# Patient Record
Sex: FEMALE | Race: WHITE | NOT HISPANIC OR LATINO | Employment: UNEMPLOYED | URBAN - METROPOLITAN AREA
[De-identification: names, ages, dates, MRNs, and addresses within clinical notes are randomized per-mention and may not be internally consistent; named-entity substitution may affect disease eponyms.]

---

## 2017-09-18 ENCOUNTER — TELEPHONE (OUTPATIENT)
Dept: CARDIOLOGY | Facility: CLINIC | Age: 82
End: 2017-09-18

## 2017-09-18 NOTE — TELEPHONE ENCOUNTER
09/18/17  11:25 AM  Christiano Bryant  3/7/1923    Ms. Bryant's daughter calls to change her appt time with Dr. Ellis. She had a new patient appt scheduled for 10/2. They are unable to make that day, but would like to stay with Dr. Ellis. I offered them an appt on 10/18, but her daughter states they can only come in the afternoon. I offered them an appt with Dr. Ellis on 10/23 at 1240 which they accepted.     She states that Ms. Bryant in on warfarin that has been dosed by Dr. Joaquin's office. I explained that we cannot dose the warfarin until the patient is seen in our office. I placed her on the waitlist and advised they contact Dr. Joaquin's practice for further instructions.    Lashawn LEBRON RN

## 2017-10-23 ENCOUNTER — HOSPITAL ENCOUNTER (OUTPATIENT)
Dept: CARDIOLOGY | Facility: HOSPITAL | Age: 82
Setting detail: RECURRING SERIES
Discharge: HOME | End: 2017-10-23

## 2017-10-23 ENCOUNTER — OFFICE VISIT (OUTPATIENT)
Dept: CARDIOLOGY | Facility: CLINIC | Age: 82
End: 2017-10-23

## 2017-10-23 VITALS
HEART RATE: 51 BPM | WEIGHT: 139.6 LBS | DIASTOLIC BLOOD PRESSURE: 72 MMHG | BODY MASS INDEX: 23.26 KG/M2 | SYSTOLIC BLOOD PRESSURE: 142 MMHG | HEIGHT: 65 IN

## 2017-10-23 DIAGNOSIS — I48.19 PERSISTENT ATRIAL FIBRILLATION (HCC): ICD-10-CM

## 2017-10-23 DIAGNOSIS — I10 ESSENTIAL HYPERTENSION: Primary | ICD-10-CM

## 2017-10-23 PROBLEM — I48.91 ATRIAL FIBRILLATION (HCC): Status: ACTIVE | Noted: 2017-10-23

## 2017-10-23 PROCEDURE — 85610 PROTHROMBIN TIME: CPT

## 2017-10-23 PROCEDURE — 99204 OFFICE O/P NEW MOD 45 MIN: CPT | Performed by: INTERNAL MEDICINE

## 2017-10-23 PROCEDURE — 93000 ELECTROCARDIOGRAM COMPLETE: CPT | Performed by: INTERNAL MEDICINE

## 2017-10-23 PROCEDURE — 36416 COLLJ CAPILLARY BLOOD SPEC: CPT

## 2017-10-23 RX ORDER — FUROSEMIDE 20 MG/1
20 TABLET ORAL 2 TIMES DAILY
COMMUNITY
End: 2017-10-23 | Stop reason: SDUPTHER

## 2017-10-23 RX ORDER — ASPIRIN 81 MG/1
81 TABLET ORAL DAILY
COMMUNITY
End: 2017-10-23

## 2017-10-23 RX ORDER — FUROSEMIDE 20 MG/1
TABLET ORAL
Qty: 200 TABLET | Refills: 3 | Status: SHIPPED | OUTPATIENT
Start: 2017-10-23 | End: 2017-10-23 | Stop reason: SDUPTHER

## 2017-10-23 RX ORDER — CHLOROTHIAZIDE 250 MG/1
250 TABLET ORAL EVERY MORNING
Qty: 90 TABLET | Refills: 3 | Status: SHIPPED | OUTPATIENT
Start: 2017-10-23 | End: 2017-10-23 | Stop reason: SDUPTHER

## 2017-10-23 RX ORDER — FUROSEMIDE 20 MG/1
TABLET ORAL
Qty: 200 TABLET | Refills: 3 | Status: SHIPPED | OUTPATIENT
Start: 2017-10-23 | End: 2017-10-24 | Stop reason: SDUPTHER

## 2017-10-23 RX ORDER — CHLOROTHIAZIDE 250 MG/1
TABLET ORAL
Refills: 0 | COMMUNITY
Start: 2017-08-01 | End: 2017-10-23 | Stop reason: SDUPTHER

## 2017-10-23 RX ORDER — WARFARIN SODIUM 5 MG/1
TABLET ORAL
Qty: 60 TABLET | Refills: 2 | Status: SHIPPED | OUTPATIENT
Start: 2017-10-23 | End: 2017-10-23 | Stop reason: SDUPTHER

## 2017-10-23 RX ORDER — GABAPENTIN 300 MG/1
300 CAPSULE ORAL 3 TIMES DAILY
COMMUNITY
End: 2017-11-03

## 2017-10-23 RX ORDER — CARVEDILOL 6.25 MG/1
6.25 TABLET ORAL 2 TIMES DAILY WITH MEALS
Qty: 180 TABLET | Refills: 3 | Status: SHIPPED | OUTPATIENT
Start: 2017-10-23 | End: 2017-10-24 | Stop reason: SDUPTHER

## 2017-10-23 RX ORDER — WARFARIN SODIUM 5 MG/1
10 TABLET ORAL
Qty: 60 TABLET | Refills: 0 | Status: SHIPPED | OUTPATIENT
Start: 2017-10-23 | End: 2017-10-24 | Stop reason: SDUPTHER

## 2017-10-23 RX ORDER — CARVEDILOL 6.25 MG/1
TABLET ORAL
Refills: 0 | COMMUNITY
Start: 2017-08-21 | End: 2017-10-23 | Stop reason: SDUPTHER

## 2017-10-23 RX ORDER — WARFARIN SODIUM 5 MG/1
TABLET ORAL
Refills: 2 | COMMUNITY
Start: 2017-09-17 | End: 2017-10-23 | Stop reason: SDUPTHER

## 2017-10-23 RX ORDER — PILOCARPINE HYDROCHLORIDE 20 MG/ML
SOLUTION/ DROPS OPHTHALMIC DAILY
COMMUNITY
Start: 2017-09-23 | End: 2018-11-28 | Stop reason: SDDI

## 2017-10-23 RX ORDER — CARVEDILOL 6.25 MG/1
6.25 TABLET ORAL 2 TIMES DAILY WITH MEALS
Qty: 180 TABLET | Refills: 3 | Status: SHIPPED | OUTPATIENT
Start: 2017-10-23 | End: 2017-10-23 | Stop reason: SDUPTHER

## 2017-10-23 RX ORDER — CHLOROTHIAZIDE 250 MG/1
250 TABLET ORAL EVERY MORNING
Qty: 90 TABLET | Refills: 3 | Status: SHIPPED | OUTPATIENT
Start: 2017-10-23 | End: 2017-10-24 | Stop reason: SDUPTHER

## 2017-10-23 NOTE — PROGRESS NOTES
Date of Office Visit: 10/23/2017  Encounter Provider: Frida Ellis MD  Place of Service: [facility] CARDIOLOGY  Patient Name: Christiano Bryant  :3/7/1923    Chief complaint  Consult requested by Dr. rose (Dr. Mccoy for management of atrial fibrillation.    History of Present Illness  Patient is a delightful 89-year-old female with history of hypertension, hyperlipidemia, chronic renal insufficiency who had presented in 2016 with a TIA in the setting of new-onset atrial fibrillation.  She had not been on aspirin therapy at the time.  An echocardiogram revealed an ejection fraction 66% without significant valvular heart disease.  She was started on Eliquis and subsequently dismissed home without plans for aspirin therapy.    In the interim she states she was switched to Eliquis and at some point she started taking aspirin on her own.  She is still lives independently and has had  gait imbalance that she's noticed in the past year has had one or 2 falls though nothing that has led to any subsequently injured trauma.  Blood pressure checked sporadically has been as it is today.  On her own she decrease Lasix dose to just 20 mg twice a day    Past Medical History:   Diagnosis Date   • Anxiety    • Atrial fibrillation    • CKD (chronic kidney disease), stage III    • Dyslipidemia    • Elevated troponin    • Generalized weakness    • Hypertension    • Meningioma    • Near syncope    • Neuropathy    • Paresthesia    • Skin cancer    • TIA (transient ischemic attack)    • Weakness of lower extremity    • Weakness of right upper extremity      Past Surgical History:   Procedure Laterality Date   • APPENDECTOMY     • EYE SURGERY     • KNEE SURGERY Left     torn miniscus repair   • OOPHORECTOMY       No outpatient prescriptions prior to visit.     No facility-administered medications prior to visit.      Allergies as of 10/23/2017 - De as Reviewed 10/23/2017   Allergen Reaction Noted    • Penicillins  10/20/2017     Social History     Social History   • Marital status:      Spouse name: N/A   • Number of children: N/A   • Years of education: N/A     Occupational History   • Not on file.     Social History Main Topics   • Smoking status: Never Smoker   • Smokeless tobacco: Never Used   • Alcohol use 0.6 oz/week     1 Glasses of wine per week      Comment: per week   • Drug use: Not on file   • Sexual activity: Not on file     Other Topics Concern   • Not on file     Social History Narrative     Family History   Problem Relation Age of Onset   • Angina Mother    • Cancer Father    • No Known Problems Maternal Grandmother    • No Known Problems Maternal Grandfather    • No Known Problems Paternal Grandmother    • No Known Problems Paternal Grandfather      Review of Systems   Constitution: Negative for fever, malaise/fatigue, weight gain and weight loss.   HENT: Negative for ear pain, hearing loss, nosebleeds and sore throat.    Eyes: Negative for double vision, pain, vision loss in left eye and vision loss in right eye.   Cardiovascular:        See history of present illness.   Respiratory: Positive for cough and wheezing. Negative for shortness of breath, sleep disturbances due to breathing and snoring.    Endocrine: Negative for cold intolerance, heat intolerance and polyuria.   Skin: Negative for itching, poor wound healing and rash.   Musculoskeletal: Negative for joint pain, joint swelling and myalgias.   Gastrointestinal: Negative for abdominal pain, diarrhea, hematochezia, nausea and vomiting.   Genitourinary: Negative for hematuria and hesitancy.   Neurological: Positive for numbness. Negative for paresthesias and seizures.   Psychiatric/Behavioral: Negative for depression. The patient is not nervous/anxious.      Objective:     Vitals:    10/23/17 1306 10/23/17 1311   BP: 124/84 142/72   BP Location: Left arm Right arm   Pulse:  51   Weight: 139 lb 9.6 oz (63.3 kg) 139 lb 9.6 oz  "(63.3 kg)   Height: 64.5\" (163.8 cm) 64.5\" (163.8 cm)     Body mass index is 23.59 kg/(m^2).    Physical Exam   Constitutional: She is oriented to person, place, and time. She appears well-developed and well-nourished.   HENT:   Head: Normocephalic.   Nose: Nose normal.   Mouth/Throat: Oropharynx is clear and moist.   Eyes: Conjunctivae and EOM are normal. Pupils are equal, round, and reactive to light. Right eye exhibits no discharge. No scleral icterus.   Neck: Normal range of motion. Neck supple. No JVD present. No thyromegaly present.   Cardiovascular: Normal rate, regular rhythm, normal heart sounds and intact distal pulses.  Exam reveals no gallop and no friction rub.    No murmur heard.  Pulses:       Carotid pulses are 2+ on the right side, and 2+ on the left side.       Radial pulses are 2+ on the right side, and 2+ on the left side.        Femoral pulses are 2+ on the right side, and 2+ on the left side.       Popliteal pulses are 2+ on the right side, and 2+ on the left side.        Dorsalis pedis pulses are 2+ on the right side, and 2+ on the left side.        Posterior tibial pulses are 2+ on the right side, and 2+ on the left side.   Pulmonary/Chest: Effort normal and breath sounds normal. No respiratory distress. She has no wheezes. She has no rales.   Abdominal: Soft. Bowel sounds are normal. She exhibits no distension. There is no hepatosplenomegaly. There is no tenderness. There is no rebound.   Musculoskeletal: Normal range of motion. She exhibits no edema or tenderness.   Neurological: She is alert and oriented to person, place, and time.   Skin: Skin is warm and dry. No rash noted. No erythema.   Psychiatric: She has a normal mood and affect. Her behavior is normal. Judgment and thought content normal.   Vitals reviewed.    Lab Review:     ECG 12 Lead  Date/Time: 10/23/2017 9:37 PM  Performed by: KINA SCHNEIDER  Authorized by: KINA SCHNEIDER   Comparison: compared with previous ECG   Similar to " previous ECG  Rhythm: atrial fibrillation  Conduction: right bundle branch block  Conduction comments: Nonspecific ST T wave changes  QTc =  465msec  Clinical impression: abnormal ECG          Assessment:       Diagnosis Plan   1. Essential hypertension     2. Persistent atrial fibrillation       Plan:       1.  Persistent atrial fibrillation.  Rates are controlled.  I've asked her to discontinue aspirin while on Coumadin.  We'll continue the current regimen follow-up in 6 months though I did encourage her to pursue physical therapy options and a neurologic evaluation for significant gait and pounds.  He also discussed the possibility that warfarin may have to be discontinued in the future if get an pounds does not improve  2.  Hypertension blood pressure elevated today in our office on the right 152/78 on the left 148/72.  She will continue to monitor this and cough remains elevated  3.  Renal insufficiency  4.  History of TIA  5.  Dyslipidemia     Christiano Bryant   Home Medication Instructions ROYER:    Printed on:10/24/17 8295   Medication Information                      carvedilol (COREG) 6.25 MG tablet  Take 1 tablet by mouth 2 (Two) Times a Day With Meals.             chlorothiazide (DIURIL) 250 MG tablet  Take 1 tablet by mouth Every Morning.             furosemide (LASIX) 20 MG tablet  Take one tablet twice a day or as directed             gabapentin (NEURONTIN) 300 MG capsule  Take 300 mg by mouth 3 (Three) Times a Day.             pilocarpine (PILOCAR) 2 % ophthalmic solution               warfarin (COUMADIN) 5 MG tablet  Take 2 tablets by mouth Daily. Or as directed by [facility]                 New Medications Ordered This Visit   Medications   • carvedilol (COREG) 6.25 MG tablet     Sig: Take 1 tablet by mouth 2 (Two) Times a Day With Meals.     Dispense:  180 tablet     Refill:  3   • chlorothiazide (DIURIL) 250 MG tablet     Sig: Take 1 tablet by mouth Every Morning.     Dispense:  90 tablet     Refill:   3   • furosemide (LASIX) 20 MG tablet     Sig: Take one tablet twice a day or as directed     Dispense:  200 tablet     Refill:  3   • warfarin (COUMADIN) 5 MG tablet     Sig: Take 2 tablets by mouth Daily. Or as directed by [facility]     Dispense:  60 tablet     Refill:  0       Dictated utilizing Dragon dictation

## 2017-10-24 RX ORDER — FUROSEMIDE 20 MG/1
TABLET ORAL
Qty: 200 TABLET | Refills: 3 | Status: SHIPPED | OUTPATIENT
Start: 2017-10-24 | End: 2018-12-15 | Stop reason: SDUPTHER

## 2017-10-24 RX ORDER — WARFARIN SODIUM 5 MG/1
10 TABLET ORAL
Qty: 60 TABLET | Refills: 0 | Status: SHIPPED | OUTPATIENT
Start: 2017-10-24 | End: 2017-10-26 | Stop reason: SDUPTHER

## 2017-10-24 RX ORDER — CHLOROTHIAZIDE 250 MG/1
250 TABLET ORAL EVERY MORNING
Qty: 90 TABLET | Refills: 3 | Status: SHIPPED | OUTPATIENT
Start: 2017-10-24 | End: 2018-11-03 | Stop reason: SDUPTHER

## 2017-10-24 RX ORDER — CARVEDILOL 6.25 MG/1
6.25 TABLET ORAL 2 TIMES DAILY WITH MEALS
Qty: 180 TABLET | Refills: 3 | Status: SHIPPED | OUTPATIENT
Start: 2017-10-24 | End: 2017-11-17

## 2017-10-26 RX ORDER — WARFARIN SODIUM 5 MG/1
10 TABLET ORAL
Qty: 180 TABLET | Refills: 0 | Status: SHIPPED | OUTPATIENT
Start: 2017-10-26 | End: 2017-11-17

## 2017-11-03 ENCOUNTER — OFFICE VISIT (OUTPATIENT)
Dept: FAMILY MEDICINE CLINIC | Facility: CLINIC | Age: 82
End: 2017-11-03

## 2017-11-03 VITALS
DIASTOLIC BLOOD PRESSURE: 62 MMHG | SYSTOLIC BLOOD PRESSURE: 140 MMHG | WEIGHT: 140 LBS | TEMPERATURE: 98 F | BODY MASS INDEX: 23.32 KG/M2 | OXYGEN SATURATION: 98 % | HEIGHT: 65 IN | HEART RATE: 72 BPM

## 2017-11-03 DIAGNOSIS — Z23 FLU VACCINE NEED: ICD-10-CM

## 2017-11-03 DIAGNOSIS — I10 ESSENTIAL HYPERTENSION: ICD-10-CM

## 2017-11-03 DIAGNOSIS — I48.19 PERSISTENT ATRIAL FIBRILLATION (HCC): Primary | ICD-10-CM

## 2017-11-03 DIAGNOSIS — F51.01 PRIMARY INSOMNIA: ICD-10-CM

## 2017-11-03 DIAGNOSIS — Z79.899 HIGH RISK MEDICATION USE: ICD-10-CM

## 2017-11-03 LAB
INR PPP: 1.5 (ref 0.9–1.1)
PROTHROMBIN TIME: 17.4 SECONDS

## 2017-11-03 PROCEDURE — 36416 COLLJ CAPILLARY BLOOD SPEC: CPT | Performed by: FAMILY MEDICINE

## 2017-11-03 PROCEDURE — 90662 IIV NO PRSV INCREASED AG IM: CPT | Performed by: FAMILY MEDICINE

## 2017-11-03 PROCEDURE — 99204 OFFICE O/P NEW MOD 45 MIN: CPT | Performed by: FAMILY MEDICINE

## 2017-11-03 PROCEDURE — G0008 ADMIN INFLUENZA VIRUS VAC: HCPCS | Performed by: FAMILY MEDICINE

## 2017-11-03 PROCEDURE — 85610 PROTHROMBIN TIME: CPT | Performed by: FAMILY MEDICINE

## 2017-11-03 RX ORDER — TEMAZEPAM 15 MG/1
15 CAPSULE ORAL NIGHTLY PRN
COMMUNITY
End: 2017-11-03

## 2017-11-03 NOTE — PROGRESS NOTES
Subjective   Christiano Bryant is a 89 y.o. female.     Chief Complaint   Patient presents with   • Establish Care     Patient is wanting to establish primary care. Patient is needing gabapentin and temazpam refilled    • Coagulation Disorder     Patient is needing her pt/inr checked        HPI     Patient is a new patient to our office today to discuss some issues that are new to me.  Patient has a stable past medical history of atrial fibrillation on anticoagulation with Coumadin, hypertension, primary insomnia.  Patient is currently taking carvedilol 6.25 mg twice a day, gabapentin 300 mg 3 times a day, chlorothiazide 250 mg every morning, temazepam 15 mg nightly, Coumadin 5 mg daily, Lasix 20 mg twice a day.  She has been out of her gabapentin and temazepam for almost 3 weeks and has had no issues.    The following portions of the patient's history were reviewed and updated as appropriate: allergies, current medications, past family history, past medical history, past social history, past surgical history and problem list.    Review of Systems   Constitutional: Negative for fever.   All other systems reviewed and are negative.      Objective  Vitals:    11/03/17 1237   BP: 140/62   Pulse: 72   Temp: 98 °F (36.7 °C)   SpO2: 98%        Physical Exam   Constitutional: She is oriented to person, place, and time. She appears well-developed and well-nourished. No distress.   HENT:   Head: Normocephalic and atraumatic.   Right Ear: External ear normal.   Left Ear: External ear normal.   Nose: Nose normal.   Mouth/Throat: Oropharynx is clear and moist.   Eyes: Conjunctivae and EOM are normal. Pupils are equal, round, and reactive to light. Right eye exhibits no discharge. Left eye exhibits no discharge. No scleral icterus.   Neck: Normal range of motion. Neck supple.   Cardiovascular: Normal rate, regular rhythm and normal heart sounds.  Exam reveals no friction rub.    No murmur heard.  Pulmonary/Chest: Effort normal and  breath sounds normal. No respiratory distress. She has no wheezes. She has no rales.   Abdominal: Soft. Bowel sounds are normal. She exhibits no distension. There is no tenderness. There is no rebound and no guarding.   Lymphadenopathy:     She has no cervical adenopathy.   Neurological: She is alert and oriented to person, place, and time.   Skin: Skin is warm and dry. She is not diaphoretic.   Nursing note and vitals reviewed.        Current Outpatient Prescriptions:   •  carvedilol (COREG) 6.25 MG tablet, Take 1 tablet by mouth 2 (Two) Times a Day With Meals., Disp: 180 tablet, Rfl: 3  •  chlorothiazide (DIURIL) 250 MG tablet, Take 1 tablet by mouth Every Morning., Disp: 90 tablet, Rfl: 3  •  furosemide (LASIX) 20 MG tablet, Take one tablet twice a day or as directed, Disp: 200 tablet, Rfl: 3  •  pilocarpine (PILOCAR) 2 % ophthalmic solution, , Disp: , Rfl:   •  warfarin (COUMADIN) 5 MG tablet, Take 2 tablets by mouth Daily. Or as directed by [facility] (Patient taking differently: Take 5 mg by mouth Daily. Take 5mg on sun, tue, thurs,and sat and take2.5mg onmon,wed, fri), Disp: 180 tablet, Rfl: 0    Procedures    Lab Results (most recent)     Procedure Component Value Units Date/Time    POC Protime / INR [[id number]]  (Abnormal) Collected:  11/03/17 1248    Specimen:  Blood Updated:  11/03/17 1251     Protime 17.4 seconds      INR 1.5 (A)          Assessment/Plan   Christiano was seen today for establish care and coagulation disorder.    Diagnoses and all orders for this visit:    Persistent atrial fibrillation  -     POC Protime / INR    Flu vaccine need  -     Cancel: Flu Vaccine Quad PF 3YR+  -     Flu Vaccine High Dose PF 65YR+    Essential hypertension    High risk medication use    Primary insomnia    Extensive conversation had with the patient regarding the dangers of some of the medications that she is taking.  Advised the patient to discontinue the gabapentin as she does not need this medication in the  temazepam as this is a high risk medication.  Some samples of Silenor 3 mg were given to the patient to use over the next couple weeks.  Patient will follow-up in our office in 2 weeks to discuss other potential changes.    45 minutes was spent of the 45 minute visit in direct counseling and coordination of care regarding:   Encounter Diagnoses   Name Primary?   • Persistent atrial fibrillation Yes   • Flu vaccine need    • Essential hypertension    • High risk medication use    • Primary insomnia        Return in about 2 weeks (around 11/17/2017) for Recheck.      Randall Mccoy MD

## 2017-11-03 NOTE — PROGRESS NOTES
"Subjective   Christiano Bryant is a 89 y.o. female.     History of Present Illness   Christiano Bryant 89 y.o. female who presents today for a new patient appointment.    she has a history of   Patient Active Problem List   Diagnosis   • Hypertension   • Atrial fibrillation   .  she is here to establish care I reviewed the PFSH recorded today by my MA/LPN staff.   she   She has been feeling well.    The following portions of the patient's history were reviewed and updated as appropriate: allergies, current medications, past social history and problem list.    Review of Systems   Constitutional: Negative for fever.   All other systems reviewed and are negative.      Objective   /62 (BP Location: Right arm, Patient Position: Sitting)  Pulse 72  Temp 98 °F (36.7 °C)  Ht 64.5\" (163.8 cm)  Wt 140 lb (63.5 kg)  SpO2 98%  BMI 23.66 kg/m2  Physical Exam    Assessment/Plan   Problem List Items Addressed This Visit     None             "

## 2017-11-10 ENCOUNTER — OFFICE VISIT (OUTPATIENT)
Dept: FAMILY MEDICINE CLINIC | Facility: CLINIC | Age: 82
End: 2017-11-10

## 2017-11-10 VITALS
TEMPERATURE: 98.2 F | OXYGEN SATURATION: 98 % | RESPIRATION RATE: 16 BRPM | SYSTOLIC BLOOD PRESSURE: 134 MMHG | DIASTOLIC BLOOD PRESSURE: 62 MMHG | WEIGHT: 139 LBS | HEIGHT: 65 IN | BODY MASS INDEX: 23.16 KG/M2 | HEART RATE: 46 BPM

## 2017-11-10 DIAGNOSIS — I10 ESSENTIAL HYPERTENSION: ICD-10-CM

## 2017-11-10 DIAGNOSIS — R00.1 BRADYCARDIA: Primary | ICD-10-CM

## 2017-11-10 DIAGNOSIS — I48.19 PERSISTENT ATRIAL FIBRILLATION (HCC): ICD-10-CM

## 2017-11-10 DIAGNOSIS — F51.01 PRIMARY INSOMNIA: ICD-10-CM

## 2017-11-10 PROCEDURE — 99214 OFFICE O/P EST MOD 30 MIN: CPT | Performed by: FAMILY MEDICINE

## 2017-11-10 NOTE — PROGRESS NOTES
Subjective   Christiano Bryant is a 89 y.o. female.     Chief Complaint   Patient presents with   • Establish Care     Patient needs to establisha a care.    • Coagulation Disorder     Patient needs to find out when she needs to come to have her INR checked.    • Insomnia     Paient is not gettting enough sleep she always has issue with this.        HPI     Patient presents to the office today for follow-up from her visit last week where we made some changes to her medication regimen.  At that visit we discontinued some of her previous sleeping pill regimen which included benzodiazepines.  She was given samples of doxepin 3 mg.  She states that she's been trying these and has noticed a slight improvement in her sleep.  She is also uses melatonin for this.  No adverse side effects noted.  She has been complaining of some fatigue and weakness.  Blood pressure today is 134/62 with a pulse of 46.  She does have a history of A. fib and hypertension.  She's currently taking carvedilol 6.25 mg twice a day, chlorothiazide 250 mg daily and one half of a 20 mg Lasix tablet.    The following portions of the patient's history were reviewed and updated as appropriate: allergies, current medications, past family history, past medical history, past social history, past surgical history and problem list.    Review of Systems   Constitutional: Negative for activity change.   All other systems reviewed and are negative.      Objective  Vitals:    11/10/17 1243   BP: 134/62   Pulse: (!) 46   Resp: 16   Temp: 98.2 °F (36.8 °C)   SpO2: 98%        Physical Exam   Constitutional: She is oriented to person, place, and time. She appears well-developed and well-nourished. No distress.   HENT:   Head: Normocephalic and atraumatic.   Right Ear: External ear normal.   Left Ear: External ear normal.   Nose: Nose normal.   Mouth/Throat: Oropharynx is clear and moist.   Eyes: Conjunctivae and EOM are normal. Pupils are equal, round, and reactive to  light. Right eye exhibits no discharge. Left eye exhibits no discharge. No scleral icterus.   Cardiovascular: Regular rhythm and normal heart sounds.  Bradycardia present.    Pulmonary/Chest: Effort normal and breath sounds normal. No respiratory distress. She has no wheezes. She has no rales.   Abdominal: Soft. Bowel sounds are normal. She exhibits no distension. There is no tenderness.   Neurological: She is alert and oriented to person, place, and time.   Skin: Skin is warm and dry. She is not diaphoretic.   Nursing note and vitals reviewed.        Current Outpatient Prescriptions:   •  carvedilol (COREG) 6.25 MG tablet, Take 1 tablet by mouth 2 (Two) Times a Day With Meals., Disp: 180 tablet, Rfl: 3  •  chlorothiazide (DIURIL) 250 MG tablet, Take 1 tablet by mouth Every Morning., Disp: 90 tablet, Rfl: 3  •  pilocarpine (PILOCAR) 2 % ophthalmic solution, , Disp: , Rfl:   •  warfarin (COUMADIN) 5 MG tablet, Take 2 tablets by mouth Daily. Or as directed by [facility] (Patient taking differently: Take 5 mg by mouth Daily. Take 5mg on sun, tue, thurs,and sat and take2.5mg onmon,wed, fri), Disp: 180 tablet, Rfl: 0  •  furosemide (LASIX) 20 MG tablet, Take one tablet twice a day or as directed (Patient taking differently: 10 mg Daily. Take one tablet twice a day or as directed), Disp: 200 tablet, Rfl: 3    Procedures    Lab Results (most recent)     None          Assessment/Plan   Christiano was seen today for establish care, coagulation disorder and insomnia.    Diagnoses and all orders for this visit:    Bradycardia  -     CBC Auto Differential  -     Comprehensive Metabolic Panel  -     Magnesium  -     Phosphorus    Persistent atrial fibrillation  -     CBC Auto Differential  -     Comprehensive Metabolic Panel  -     Magnesium  -     Phosphorus    Essential hypertension  -     CBC Auto Differential  -     Comprehensive Metabolic Panel  -     Magnesium  -     Phosphorus    We'll get labs as above to evaluate for the  patient's symptomatic bradycardia.  Advised that she take one half of her carvedilol tablet twice a day.  Advised her to discontinue the Lasix.  Discussed reasons for an ER visit.  Follow-up next week.  Samples were given for the patient to take doxepin 6 mg nightly.    Return in about 1 week (around 11/17/2017) for Recheck.      Randall Mccoy MD

## 2017-11-11 LAB
ALBUMIN SERPL-MCNC: 4.1 G/DL (ref 3.2–4.6)
ALBUMIN/GLOB SERPL: 1.9 {RATIO} (ref 1.2–2.2)
ALP SERPL-CCNC: 92 IU/L (ref 39–117)
ALT SERPL-CCNC: 15 IU/L (ref 0–32)
AST SERPL-CCNC: 17 IU/L (ref 0–40)
BASOPHILS # BLD AUTO: 0 X10E3/UL (ref 0–0.2)
BASOPHILS NFR BLD AUTO: 0 %
BILIRUB SERPL-MCNC: 1.1 MG/DL (ref 0–1.2)
BUN SERPL-MCNC: 31 MG/DL (ref 10–36)
BUN/CREAT SERPL: 25 (ref 12–28)
CALCIUM SERPL-MCNC: 10 MG/DL (ref 8.7–10.3)
CHLORIDE SERPL-SCNC: 100 MMOL/L (ref 96–106)
CO2 SERPL-SCNC: 27 MMOL/L (ref 18–29)
CREAT SERPL-MCNC: 1.25 MG/DL (ref 0.57–1)
EOSINOPHIL # BLD AUTO: 0.1 X10E3/UL (ref 0–0.4)
EOSINOPHIL NFR BLD AUTO: 2 %
ERYTHROCYTE [DISTWIDTH] IN BLOOD BY AUTOMATED COUNT: 14.8 % (ref 12.3–15.4)
GFR SERPLBLD CREATININE-BSD FMLA CKD-EPI: 37 ML/MIN/1.73
GFR SERPLBLD CREATININE-BSD FMLA CKD-EPI: 43 ML/MIN/1.73
GLOBULIN SER CALC-MCNC: 2.2 G/DL (ref 1.5–4.5)
GLUCOSE SERPL-MCNC: 83 MG/DL (ref 65–99)
HCT VFR BLD AUTO: 37.4 % (ref 34–46.6)
HGB BLD-MCNC: 12.3 G/DL (ref 11.1–15.9)
IMM GRANULOCYTES # BLD: 0 X10E3/UL (ref 0–0.1)
IMM GRANULOCYTES NFR BLD: 0 %
LYMPHOCYTES # BLD AUTO: 1.3 X10E3/UL (ref 0.7–3.1)
LYMPHOCYTES NFR BLD AUTO: 20 %
MAGNESIUM SERPL-MCNC: 2.1 MG/DL (ref 1.6–2.3)
MCH RBC QN AUTO: 27.9 PG (ref 26.6–33)
MCHC RBC AUTO-ENTMCNC: 32.9 G/DL (ref 31.5–35.7)
MCV RBC AUTO: 85 FL (ref 79–97)
MONOCYTES # BLD AUTO: 0.5 X10E3/UL (ref 0.1–0.9)
MONOCYTES NFR BLD AUTO: 8 %
MORPHOLOGY BLD-IMP: (no result)
NEUTROPHILS # BLD AUTO: 4.4 X10E3/UL (ref 1.4–7)
NEUTROPHILS NFR BLD AUTO: 70 %
PHOSPHATE SERPL-MCNC: 3.3 MG/DL (ref 2.5–4.5)
PLATELET # BLD AUTO: 113 X10E3/UL (ref 150–379)
POTASSIUM SERPL-SCNC: 4.1 MMOL/L (ref 3.5–5.2)
PROT SERPL-MCNC: 6.3 G/DL (ref 6–8.5)
RBC # BLD AUTO: 4.41 X10E6/UL (ref 3.77–5.28)
SODIUM SERPL-SCNC: 141 MMOL/L (ref 134–144)
WBC # BLD AUTO: 6.3 X10E3/UL (ref 3.4–10.8)

## 2017-11-13 ENCOUNTER — TELEPHONE (OUTPATIENT)
Dept: FAMILY MEDICINE CLINIC | Facility: CLINIC | Age: 82
End: 2017-11-13

## 2017-11-13 NOTE — TELEPHONE ENCOUNTER
I would like to avoid having to restart that medication due to adverse side effect profile.  I would prefer if the patient will try Tylenol 650 mg 3 times a day to see if this helps.  If not we can discuss going back on gabapentin.  Thank you.

## 2017-11-17 ENCOUNTER — OFFICE VISIT (OUTPATIENT)
Dept: FAMILY MEDICINE CLINIC | Facility: CLINIC | Age: 82
End: 2017-11-17

## 2017-11-17 VITALS
BODY MASS INDEX: 22.66 KG/M2 | WEIGHT: 136 LBS | OXYGEN SATURATION: 95 % | HEART RATE: 65 BPM | TEMPERATURE: 98.1 F | SYSTOLIC BLOOD PRESSURE: 110 MMHG | HEIGHT: 65 IN | DIASTOLIC BLOOD PRESSURE: 60 MMHG

## 2017-11-17 DIAGNOSIS — Z79.01 CHRONIC ANTICOAGULATION: ICD-10-CM

## 2017-11-17 DIAGNOSIS — I10 ESSENTIAL HYPERTENSION: Primary | ICD-10-CM

## 2017-11-17 DIAGNOSIS — Z79.899 HIGH RISK MEDICATION USE: ICD-10-CM

## 2017-11-17 DIAGNOSIS — F51.01 PRIMARY INSOMNIA: ICD-10-CM

## 2017-11-17 DIAGNOSIS — I48.19 PERSISTENT ATRIAL FIBRILLATION (HCC): ICD-10-CM

## 2017-11-17 PROCEDURE — 99214 OFFICE O/P EST MOD 30 MIN: CPT | Performed by: FAMILY MEDICINE

## 2017-11-17 RX ORDER — CARVEDILOL 6.25 MG/1
3.12 TABLET ORAL 2 TIMES DAILY WITH MEALS
Qty: 180 TABLET | Refills: 3 | Status: SHIPPED | OUTPATIENT
Start: 2017-11-17 | End: 2019-03-26

## 2017-11-17 RX ORDER — WARFARIN SODIUM 5 MG/1
TABLET ORAL
Qty: 90 TABLET | Refills: 2 | Status: SHIPPED | OUTPATIENT
Start: 2017-11-17 | End: 2018-03-25 | Stop reason: SDUPTHER

## 2017-11-17 NOTE — PROGRESS NOTES
Subjective   Christiano Bryant is a 89 y.o. female.     Chief Complaint   Patient presents with   • Fatigue      follow up on warfarin and pt states to feel very tired lately       HPI     Patient resents the office today to follow-up on a number of issues.  History of hypertension and A. fib for which she has been taking carvedilol 6.25 mg twice a day.  She does report some weakness and has been documented to have right cardio in the past.  Blood pressure today is 110/60 with a heart rate of 65.  She also suffers from insomnia which she was producing taking temazepam.  We have weaned her off of this medication and she is successfully having good results with doxepin 6 mg nightly.  She is in need of her INR checked today.  Taking Coumadin 5 mg on Saturday, Sunday, and Wednesday with one half tablet on the rest of the days.    The following portions of the patient's history were reviewed and updated as appropriate: allergies, current medications, past family history, past medical history, past social history, past surgical history and problem list.    Review of Systems   Constitutional: Positive for fatigue.   HENT: Negative.    Eyes: Negative.    Respiratory: Negative.    Cardiovascular: Negative.    Endocrine: Negative.    Genitourinary: Negative.    Musculoskeletal: Positive for myalgias.   Skin: Negative.    Allergic/Immunologic: Negative.    Neurological: Positive for weakness.   Hematological: Negative.    Psychiatric/Behavioral: Positive for sleep disturbance.   All other systems reviewed and are negative.      Objective  Vitals:    11/17/17 1534   BP: 110/60   Pulse: 65   Temp: 98.1 °F (36.7 °C)   SpO2: 95%        Physical Exam   Constitutional: She is oriented to person, place, and time. She appears well-developed and well-nourished. No distress.   HENT:   Head: Normocephalic and atraumatic.   Right Ear: External ear normal.   Left Ear: External ear normal.   Nose: Nose normal.   Mouth/Throat: Oropharynx is clear  and moist.   Eyes: Conjunctivae and EOM are normal. Pupils are equal, round, and reactive to light. Right eye exhibits no discharge. Left eye exhibits no discharge. No scleral icterus.   Cardiovascular: Normal rate, regular rhythm and normal heart sounds.    Pulmonary/Chest: Effort normal and breath sounds normal. No respiratory distress. She has no wheezes. She has no rales.   Abdominal: Soft. Bowel sounds are normal. She exhibits no distension. There is no tenderness.   Neurological: She is alert and oriented to person, place, and time.   Skin: Skin is warm and dry. She is not diaphoretic.   Nursing note and vitals reviewed.        Current Outpatient Prescriptions:   •  carvedilol (COREG) 6.25 MG tablet, Take 0.5 tablets by mouth 2 (Two) Times a Day With Meals., Disp: 180 tablet, Rfl: 3  •  chlorothiazide (DIURIL) 250 MG tablet, Take 1 tablet by mouth Every Morning., Disp: 90 tablet, Rfl: 3  •  furosemide (LASIX) 20 MG tablet, Take one tablet twice a day or as directed (Patient taking differently: 10 mg Daily. Take one tablet twice a day or as directed), Disp: 200 tablet, Rfl: 3  •  pilocarpine (PILOCAR) 2 % ophthalmic solution, , Disp: , Rfl:   •  warfarin (COUMADIN) 5 MG tablet, 1 tab on Saturday, Sunday, Monday, and Tuesday. 1/2 tab on Wednesday, Thursday, and Friday., Disp: 90 tablet, Rfl: 2    Procedures    Lab Results (most recent)     None          Assessment/Plan   Christiano was seen today for fatigue.    Diagnoses and all orders for this visit:    Essential hypertension  -     carvedilol (COREG) 6.25 MG tablet; Take 0.5 tablets by mouth 2 (Two) Times a Day With Meals.    Persistent atrial fibrillation  -     carvedilol (COREG) 6.25 MG tablet; Take 0.5 tablets by mouth 2 (Two) Times a Day With Meals.  -     warfarin (COUMADIN) 5 MG tablet; 1 tab on Saturday, Sunday, Monday, and Tuesday. 1/2 tab on Wednesday, Thursday, and Friday.    Primary insomnia    High risk medication use  -     warfarin (COUMADIN) 5 MG  tablet; 1 tab on Saturday, Sunday, Monday, and Tuesday. 1/2 tab on Wednesday, Thursday, and Friday.    Chronic anticoagulation  -     warfarin (COUMADIN) 5 MG tablet; 1 tab on Saturday, Sunday, Monday, and Tuesday. 1/2 tab on Wednesday, Thursday, and Friday.    INR is 1.6.  We will increase the dose and adjust her regimen to 5 mg on Saturday, Sunday, Monday, and Tuesday with half a tablet on Wednesday, Thursday, and Friday.  This may improve her adherence to the medication due to having a little bit easier time with pill placement in her pillbox.  We will continue doxepin 6 mg for sleep.  We will decrease her carvedilol to one half tablet twice a day.  We'll follow-up in 2 weeks.      Return in about 2 weeks (around 12/1/2017) for Recheck.      Randall Mccoy MD

## 2017-11-29 ENCOUNTER — OFFICE VISIT (OUTPATIENT)
Dept: FAMILY MEDICINE CLINIC | Facility: CLINIC | Age: 82
End: 2017-11-29

## 2017-11-29 VITALS
TEMPERATURE: 98.6 F | RESPIRATION RATE: 12 BRPM | OXYGEN SATURATION: 99 % | SYSTOLIC BLOOD PRESSURE: 138 MMHG | WEIGHT: 136 LBS | HEIGHT: 64 IN | HEART RATE: 62 BPM | DIASTOLIC BLOOD PRESSURE: 52 MMHG | BODY MASS INDEX: 23.22 KG/M2

## 2017-11-29 DIAGNOSIS — I48.19 PERSISTENT ATRIAL FIBRILLATION (HCC): Primary | ICD-10-CM

## 2017-11-29 DIAGNOSIS — J32.9 SINOBRONCHITIS: ICD-10-CM

## 2017-11-29 DIAGNOSIS — J40 SINOBRONCHITIS: ICD-10-CM

## 2017-11-29 LAB
EXPIRATION DATE: NORMAL
FLUAV AG NPH QL: NORMAL
FLUBV AG NPH QL: NORMAL
INR PPP: 1.6 (ref 0.9–1.1)
INTERNAL CONTROL: NORMAL
Lab: NORMAL
PROTHROMBIN TIME: 29 SECONDS

## 2017-11-29 PROCEDURE — 87804 INFLUENZA ASSAY W/OPTIC: CPT | Performed by: FAMILY MEDICINE

## 2017-11-29 PROCEDURE — 85610 PROTHROMBIN TIME: CPT | Performed by: FAMILY MEDICINE

## 2017-11-29 PROCEDURE — 99214 OFFICE O/P EST MOD 30 MIN: CPT | Performed by: FAMILY MEDICINE

## 2017-11-29 PROCEDURE — 36416 COLLJ CAPILLARY BLOOD SPEC: CPT | Performed by: FAMILY MEDICINE

## 2017-11-29 RX ORDER — AZITHROMYCIN 250 MG/1
TABLET, FILM COATED ORAL
Qty: 6 TABLET | Refills: 0 | Status: SHIPPED | OUTPATIENT
Start: 2017-11-29 | End: 2018-01-19

## 2017-11-29 RX ORDER — BENZONATATE 200 MG/1
200 CAPSULE ORAL 3 TIMES DAILY PRN
Qty: 30 CAPSULE | Refills: 0 | Status: SHIPPED | OUTPATIENT
Start: 2017-11-29 | End: 2018-01-19

## 2017-11-29 NOTE — PROGRESS NOTES
Subjective   Christiano Bryant is a 89 y.o. female.     Chief Complaint   Patient presents with   • Cough     Patient is having cough and congestion. She needs to check on her INR.    • Back Pain     Patient has chest and back tightness and pain due to her cough.       HPI     Patient presents today complaining of some continued congestion and sinus drainage.  She's been coughing quite a bit which is causing some pain in her back.  She is also felt weak and reports some episodes of feeling lightheaded when getting up from sitting.  She does have a history of A. fib.  Currently taking and tolerating her carvedilol as well as her warfarin.  She also uses Lasix on a when necessary basis.  This is been something that I have advised her to hold recently due to concerns about long-term use of this medication.    The following portions of the patient's history were reviewed and updated as appropriate: allergies, current medications, past family history, past medical history, past social history, past surgical history and problem list.    Review of Systems   HENT: Positive for congestion.    Respiratory: Positive for cough.    Cardiovascular: Positive for chest pain.   Musculoskeletal: Positive for back pain.   All other systems reviewed and are negative.      Objective  Vitals:    11/29/17 1145   BP: 138/52   Pulse: 62   Resp: 12   Temp: 98.6 °F (37 °C)   SpO2: 99%        Physical Exam   Constitutional: She is oriented to person, place, and time. She appears well-developed and well-nourished. No distress.   HENT:   Head: Normocephalic and atraumatic.   Right Ear: Tympanic membrane, external ear and ear canal normal.   Left Ear: Tympanic membrane, external ear and ear canal normal.   Nose: Mucosal edema and rhinorrhea present. Right sinus exhibits no maxillary sinus tenderness and no frontal sinus tenderness. Left sinus exhibits no maxillary sinus tenderness and no frontal sinus tenderness.   Mouth/Throat: Uvula is midline.  Posterior oropharyngeal erythema present. No oropharyngeal exudate, posterior oropharyngeal edema or tonsillar abscesses. No tonsillar exudate.   Eyes: Conjunctivae, EOM and lids are normal. Pupils are equal, round, and reactive to light.   Cardiovascular: Normal rate, regular rhythm and normal heart sounds.  Exam reveals no friction rub.    No murmur heard.  Pulmonary/Chest: Effort normal and breath sounds normal. No respiratory distress. She has no wheezes. She has no rales.   Abdominal: Soft. Bowel sounds are normal. She exhibits no distension. There is no tenderness. There is no rebound and no guarding.   Neurological: She is alert and oriented to person, place, and time.   Skin: Skin is warm and dry. She is not diaphoretic.   Nursing note and vitals reviewed.        Current Outpatient Prescriptions:   •  carvedilol (COREG) 6.25 MG tablet, Take 0.5 tablets by mouth 2 (Two) Times a Day With Meals., Disp: 180 tablet, Rfl: 3  •  chlorothiazide (DIURIL) 250 MG tablet, Take 1 tablet by mouth Every Morning., Disp: 90 tablet, Rfl: 3  •  furosemide (LASIX) 20 MG tablet, Take one tablet twice a day or as directed (Patient taking differently: 10 mg Daily. Take one tablet twice a day or as directed), Disp: 200 tablet, Rfl: 3  •  pilocarpine (PILOCAR) 2 % ophthalmic solution, , Disp: , Rfl:   •  warfarin (COUMADIN) 5 MG tablet, 1 tab on Saturday, Sunday, Monday, and Tuesday. 1/2 tab on Wednesday, Thursday, and Friday., Disp: 90 tablet, Rfl: 2  •  azithromycin (ZITHROMAX Z-DANIEL) 250 MG tablet, Take 2 tablets the first day, then 1 tablet daily for 4 days., Disp: 6 tablet, Rfl: 0  •  benzonatate (TESSALON) 200 MG capsule, Take 1 capsule by mouth 3 (Three) Times a Day As Needed for Cough., Disp: 30 capsule, Rfl: 0    Procedures    Lab Results (most recent)     None          Assessment/Plan   Christiano was seen today for cough and back pain.    Diagnoses and all orders for this visit:    Persistent atrial fibrillation  -     POC  Protime / INR    Sinobronchitis  -     azithromycin (ZITHROMAX Z-DANIEL) 250 MG tablet; Take 2 tablets the first day, then 1 tablet daily for 4 days.  -     benzonatate (TESSALON) 200 MG capsule; Take 1 capsule by mouth 3 (Three) Times a Day As Needed for Cough.  -     POC Influenza A / B    Flu test negative.  We'll treat with azithromycin as above.  Also some Tessalon Perles.  We'll monitor closely.  Advised on reasons for an ER visit.    Return in about 1 week (around 12/6/2017) for Recheck.      Randall Mccoy MD

## 2017-12-05 RX ORDER — WARFARIN SODIUM 5 MG/1
TABLET ORAL
Qty: 60 TABLET | Refills: 0 | OUTPATIENT
Start: 2017-12-05

## 2018-01-09 ENCOUNTER — TELEPHONE (OUTPATIENT)
Dept: FAMILY MEDICINE CLINIC | Facility: CLINIC | Age: 83
End: 2018-01-09

## 2018-01-09 NOTE — TELEPHONE ENCOUNTER
Pt;s daughter called and said her mother like Alexis anabel and she needs a new Rx for it 6 mg once at night. She did ask if possible giving her a little amount of Temazepam that she used to take before and keep it for as needed she some nights does not sleep.

## 2018-01-10 RX ORDER — DOXEPIN HYDROCHLORIDE 6 MG/1
1 TABLET ORAL NIGHTLY
Qty: 30 TABLET | Refills: 5 | Status: SHIPPED | OUTPATIENT
Start: 2018-01-10 | End: 2018-04-23

## 2018-01-12 NOTE — TELEPHONE ENCOUNTER
Please call in a prescription to the patient's pharmacy for Belsomra 10 mg 1 tablet daily at bedtime dispense 30 tablets with 0 refills.  Please let patient know that a prescription has been consulted.  Thank you.

## 2018-01-19 ENCOUNTER — OFFICE VISIT (OUTPATIENT)
Dept: FAMILY MEDICINE CLINIC | Facility: CLINIC | Age: 83
End: 2018-01-19

## 2018-01-19 VITALS
SYSTOLIC BLOOD PRESSURE: 124 MMHG | WEIGHT: 142 LBS | TEMPERATURE: 98.3 F | HEART RATE: 47 BPM | OXYGEN SATURATION: 99 % | DIASTOLIC BLOOD PRESSURE: 68 MMHG | BODY MASS INDEX: 24.24 KG/M2 | HEIGHT: 64 IN | RESPIRATION RATE: 14 BRPM

## 2018-01-19 DIAGNOSIS — I48.19 PERSISTENT ATRIAL FIBRILLATION (HCC): ICD-10-CM

## 2018-01-19 DIAGNOSIS — M17.12 ARTHRITIS OF LEFT KNEE: ICD-10-CM

## 2018-01-19 DIAGNOSIS — M54.9 BACK PAIN, UNSPECIFIED BACK LOCATION, UNSPECIFIED BACK PAIN LATERALITY, UNSPECIFIED CHRONICITY: Primary | ICD-10-CM

## 2018-01-19 LAB
BILIRUB BLD-MCNC: NEGATIVE MG/DL
CLARITY, POC: CLEAR
COLOR UR: YELLOW
GLUCOSE UR STRIP-MCNC: NEGATIVE MG/DL
INR PPP: 2.2 (ref 0.9–1.1)
KETONES UR QL: NEGATIVE
LEUKOCYTE EST, POC: NEGATIVE
NITRITE UR-MCNC: NEGATIVE MG/ML
PH UR: 7 [PH] (ref 5–8)
PROT UR STRIP-MCNC: ABNORMAL MG/DL
PROTHROMBIN TIME: 22.3 SECONDS
RBC # UR STRIP: NEGATIVE /UL
SP GR UR: 1.02 (ref 1–1.03)
UROBILINOGEN UR QL: NORMAL

## 2018-01-19 PROCEDURE — 36416 COLLJ CAPILLARY BLOOD SPEC: CPT | Performed by: NURSE PRACTITIONER

## 2018-01-19 PROCEDURE — 85610 PROTHROMBIN TIME: CPT | Performed by: NURSE PRACTITIONER

## 2018-01-19 PROCEDURE — 81003 URINALYSIS AUTO W/O SCOPE: CPT | Performed by: NURSE PRACTITIONER

## 2018-01-19 PROCEDURE — 20610 DRAIN/INJ JOINT/BURSA W/O US: CPT | Performed by: NURSE PRACTITIONER

## 2018-01-19 PROCEDURE — 99213 OFFICE O/P EST LOW 20 MIN: CPT | Performed by: NURSE PRACTITIONER

## 2018-01-19 RX ORDER — TRIAMCINOLONE ACETONIDE 40 MG/ML
40 INJECTION, SUSPENSION INTRA-ARTICULAR; INTRAMUSCULAR ONCE
Status: DISCONTINUED | OUTPATIENT
Start: 2018-01-19 | End: 2018-09-21

## 2018-01-19 NOTE — PROGRESS NOTES
"Subjective   Christiano Bryant is a 89 y.o. female.     History of Present Illness   Patient presenting to the office today with her daughter who has concerns or questions or urinary tract infection.  A week ago she was feeling very tired and was having some low back pain but she's been feeling better for the past 3 days.    She has osteoarthritis in her left knee has been consistently giving her a lot of pain she does see an orthopedic she hasn't seen him in over a year she's also not had any knee injections in over a year.  She is requesting an injection today.    The following portions of the patient's history were reviewed and updated as appropriate: allergies, current medications, past social history and problem list.    Review of Systems   Musculoskeletal: Positive for back pain and joint swelling.   All other systems reviewed and are negative.      Objective   /68 (BP Location: Right arm, Patient Position: Sitting, Cuff Size: Adult)  Pulse (!) 47  Temp 98.3 °F (36.8 °C) (Oral)   Resp 14  Ht 162.6 cm (64\")  Wt 64.4 kg (142 lb)  SpO2 99%  BMI 24.37 kg/m2    Physical Exam   Constitutional: She is oriented to person, place, and time. Vital signs are normal. She appears well-developed and well-nourished. No distress.   HENT:   Head: Normocephalic.   Cardiovascular: Normal rate, regular rhythm and normal heart sounds.    Pulmonary/Chest: Effort normal and breath sounds normal.   Neurological: She is alert and oriented to person, place, and time. Gait normal.   Psychiatric: She has a normal mood and affect. Her behavior is normal. Judgment and thought content normal.   Vitals reviewed.    Arthrocentesis  Date/Time: 1/19/2018 11:01 AM  Performed by: SUKHDEV ESCOBAR  Authorized by: SUKHDEV ESCOBAR   Consent: Verbal consent obtained.  Consent given by: patient  Patient understanding: patient states understanding of the procedure being performed  Patient consent: the patient's understanding of the procedure matches " consent given  Procedure consent: procedure consent matches procedure scheduled  Relevant documents: relevant documents present and verified  Test results: test results available and properly labeled  Site marked: the operative site was marked  Imaging studies: imaging studies available  Patient identity confirmed: verbally with patient  Indications: pain   Body area: knee  Joint: left knee  Local anesthesia used: yes    Anesthesia:  Local anesthesia used: yes  Local Anesthetic: topical anesthetic    Sedation:  Patient sedated: no  Needle size: 20 G  Ultrasound guidance: no  Approach: lateral  Triamcinolone amount: 40 mg  Patient tolerance: Patient tolerated the procedure well with no immediate complications        Assessment/Plan   Problem List Items Addressed This Visit        Cardiovascular and Mediastinum    Atrial fibrillation    Relevant Orders    POC Protime / INR (Completed)       Nervous and Auditory    Back pain - Primary    Relevant Orders    POC Urinalysis Dipstick, Automated (Completed)       Musculoskeletal and Integument    Arthritis of left knee    Relevant Medications    triamcinolone acetonide (KENALOG-40) injection 40 mg (Start on 1/19/2018 11:45 AM)    Other Relevant Orders    Arthrocentesis           cont same with coumadin and recheck in 4 weeks     Ice to knee today and tomorrow rto prn    stretches for back pain and Advil

## 2018-02-09 ENCOUNTER — OFFICE VISIT (OUTPATIENT)
Dept: FAMILY MEDICINE CLINIC | Facility: CLINIC | Age: 83
End: 2018-02-09

## 2018-02-09 VITALS
DIASTOLIC BLOOD PRESSURE: 66 MMHG | SYSTOLIC BLOOD PRESSURE: 124 MMHG | TEMPERATURE: 98.6 F | HEART RATE: 61 BPM | HEIGHT: 64 IN | BODY MASS INDEX: 26.46 KG/M2 | WEIGHT: 155 LBS | OXYGEN SATURATION: 99 % | RESPIRATION RATE: 16 BRPM

## 2018-02-09 DIAGNOSIS — R26.89 BALANCE PROBLEM: ICD-10-CM

## 2018-02-09 DIAGNOSIS — R53.81 DEBILITY: ICD-10-CM

## 2018-02-09 DIAGNOSIS — F41.0 PANIC ATTACKS: ICD-10-CM

## 2018-02-09 DIAGNOSIS — F51.01 PRIMARY INSOMNIA: Primary | ICD-10-CM

## 2018-02-09 DIAGNOSIS — F51.01 PRIMARY INSOMNIA: ICD-10-CM

## 2018-02-09 PROCEDURE — 99214 OFFICE O/P EST MOD 30 MIN: CPT | Performed by: FAMILY MEDICINE

## 2018-02-09 RX ORDER — MIRTAZAPINE 7.5 MG/1
7.5 TABLET, FILM COATED ORAL NIGHTLY
Qty: 30 TABLET | Refills: 0 | Status: SHIPPED | OUTPATIENT
Start: 2018-02-09 | End: 2018-02-09 | Stop reason: SDUPTHER

## 2018-02-09 RX ORDER — MIRTAZAPINE 7.5 MG/1
TABLET, FILM COATED ORAL
Qty: 90 TABLET | Refills: 0 | Status: SHIPPED | OUTPATIENT
Start: 2018-02-09 | End: 2018-09-10

## 2018-02-09 NOTE — PROGRESS NOTES
Subjective   Christiano Bryant is a 89 y.o. female.     Chief Complaint   Patient presents with   • Anxiety     Patient had a history of anxiety and used to get a xanax before. She is having it back now she thinks it is more likely in winter time.        HPI     Patient presents to discuss a number of issues.  Patient states that she's been having panic attacks.  She states that she will have these episodes where she will get really worked up emotionally and have some shortness of breath.  She states that she was concerned recently about a storm that was coming and had an issue.  She will also wake up from sleeping at night feeling short of breath and panicky.  She's also been having issues with insomnia.  She's tried a number of prescriptions including doxepin and Belsomra.  Insurance would not cover the doxepin and she did not like the side effects of Belsomra.  She is currently not taking anything and having issues sleeping at night.  Patient states that she's also noticed over the last 6 months to year issues with balance.  The patient's daughter states that she largely just sits around watching TV all day and has become deconditioned.  Patient denies any numbness or tingling.  No vision issues.    The following portions of the patient's history were reviewed and updated as appropriate: allergies, current medications, past family history, past medical history, past social history, past surgical history and problem list.    Review of Systems   Psychiatric/Behavioral: The patient is nervous/anxious.    All other systems reviewed and are negative.      Objective  Vitals:    02/09/18 0938   BP: 124/66   Pulse: 61   Resp: 16   Temp: 98.6 °F (37 °C)   SpO2: 99%        Physical Exam   Constitutional: She is oriented to person, place, and time. She appears well-developed and well-nourished. No distress.   HENT:   Head: Normocephalic and atraumatic.   Right Ear: External ear normal.   Left Ear: External ear normal.   Nose:  Nose normal.   Mouth/Throat: Oropharynx is clear and moist.   Eyes: Conjunctivae and EOM are normal. Pupils are equal, round, and reactive to light. Right eye exhibits no discharge. Left eye exhibits no discharge. No scleral icterus.   Cardiovascular: Normal rate, regular rhythm and normal heart sounds.    Pulmonary/Chest: Effort normal and breath sounds normal. No respiratory distress. She has no wheezes. She has no rales.   Abdominal: Soft. Bowel sounds are normal. She exhibits no distension. There is no tenderness.   Neurological: She is alert and oriented to person, place, and time.   Skin: Skin is warm and dry. She is not diaphoretic.   Nursing note and vitals reviewed.        Current Outpatient Prescriptions:   •  carvedilol (COREG) 6.25 MG tablet, Take 0.5 tablets by mouth 2 (Two) Times a Day With Meals., Disp: 180 tablet, Rfl: 3  •  chlorothiazide (DIURIL) 250 MG tablet, Take 1 tablet by mouth Every Morning., Disp: 90 tablet, Rfl: 3  •  Doxepin HCl 6 MG tablet, Take 1 tablet by mouth Every Night., Disp: 30 tablet, Rfl: 5  •  pilocarpine (PILOCAR) 2 % ophthalmic solution, , Disp: , Rfl:   •  Suvorexant 10 MG tablet, Take 1 tablet by mouth Every Night., Disp: 30 tablet, Rfl: 0  •  warfarin (COUMADIN) 5 MG tablet, 1 tab on Saturday, Sunday, Monday, and Tuesday. 1/2 tab on Wednesday, Thursday, and Friday., Disp: 90 tablet, Rfl: 2  •  furosemide (LASIX) 20 MG tablet, Take one tablet twice a day or as directed (Patient taking differently: 10 mg Daily. Take one tablet twice a day or as directed), Disp: 200 tablet, Rfl: 3  •  mirtazapine (REMERON) 7.5 MG tablet, Take 1 tablet by mouth Every Night., Disp: 30 tablet, Rfl: 0    Current Facility-Administered Medications:   •  triamcinolone acetonide (KENALOG-40) injection 40 mg, 40 mg, Intra-articular, Once, [facility]    Procedures    Lab Results (most recent)     None          Assessment/Plan   Christiano was seen today for anxiety.    Diagnoses and all orders for  this visit:    Primary insomnia  -     mirtazapine (REMERON) 7.5 MG tablet; Take 1 tablet by mouth Every Night.    Panic attacks  -     mirtazapine (REMERON) 7.5 MG tablet; Take 1 tablet by mouth Every Night.    Debility  -     Ambulatory Referral to Physical Therapy Evaluate and treat    Balance problem  -     Ambulatory Referral to Physical Therapy Evaluate and treat    Patient is quite frail.  We will have the patient do physical therapy for deconditioning as well as gait training.  We discussed treatment options for both insomnia and anxiety.  Will do a one-month trial of mirtazapine.  Discussed potential adverse side effects.    Return in about 4 weeks (around 3/9/2018) for Recheck.      Randall Mccoy MD

## 2018-03-09 ENCOUNTER — OFFICE VISIT (OUTPATIENT)
Dept: FAMILY MEDICINE CLINIC | Facility: CLINIC | Age: 83
End: 2018-03-09

## 2018-03-09 VITALS
HEIGHT: 64 IN | BODY MASS INDEX: 26.46 KG/M2 | DIASTOLIC BLOOD PRESSURE: 80 MMHG | OXYGEN SATURATION: 98 % | WEIGHT: 155 LBS | SYSTOLIC BLOOD PRESSURE: 122 MMHG | TEMPERATURE: 98.6 F | RESPIRATION RATE: 14 BRPM | HEART RATE: 71 BPM

## 2018-03-09 DIAGNOSIS — F41.9 ANXIETY: ICD-10-CM

## 2018-03-09 DIAGNOSIS — F51.01 PRIMARY INSOMNIA: ICD-10-CM

## 2018-03-09 DIAGNOSIS — I48.19 PERSISTENT ATRIAL FIBRILLATION (HCC): Primary | ICD-10-CM

## 2018-03-09 LAB
INR PPP: 1.8 (ref 0.9–1.1)
PROTHROMBIN TIME: 19 SECONDS

## 2018-03-09 PROCEDURE — 99214 OFFICE O/P EST MOD 30 MIN: CPT | Performed by: FAMILY MEDICINE

## 2018-03-09 PROCEDURE — 36416 COLLJ CAPILLARY BLOOD SPEC: CPT | Performed by: FAMILY MEDICINE

## 2018-03-09 PROCEDURE — 85610 PROTHROMBIN TIME: CPT | Performed by: FAMILY MEDICINE

## 2018-03-09 NOTE — PROGRESS NOTES
Subjective   Christiano Bryant is a 89 y.o. female.     Chief Complaint   Patient presents with   • Follow-up     Patient is following up on Suvorexant It did not help her at all. She wants to go back to Xanax.    • Suspicious Skin Lesion     Patient has a mole on her chest needs to be removed it hurts and bleeds some times.    • Edema     Patient has a swelling in her ankles.        HPI     Patient presents the office today to follow-up on a number of issues.  She takes Coumadin for atrial fibrillation.  She needs an INR checked today.    Patient continues to struggle with insomnia and anxiety.  He tried a number of medication regimens with limited benefit.  She was given a prescription for mirtazapine 7.5 mg at her last office visit.  Appears that she has not been taking this medication.  She has been taking melatonin which has helped with the sleep but does not help her anxiety.  She will often have these which she calls panic attacks where she describes them as episodes of frantic short breaths and feelings of despair.    The following portions of the patient's history were reviewed and updated as appropriate: allergies, current medications, past family history, past medical history, past social history, past surgical history and problem list.    Review of Systems   Musculoskeletal: Positive for joint swelling.   All other systems reviewed and are negative.      Objective  Vitals:    03/09/18 1320   BP: 122/80   Pulse: 71   Resp: 14   Temp: 98.6 °F (37 °C)   SpO2: 98%        Physical Exam   Constitutional: She is oriented to person, place, and time. She appears well-developed and well-nourished. No distress.   HENT:   Head: Normocephalic and atraumatic.   Right Ear: External ear normal.   Left Ear: External ear normal.   Nose: Nose normal.   Mouth/Throat: Oropharynx is clear and moist.   Eyes: Conjunctivae and EOM are normal. Pupils are equal, round, and reactive to light. Right eye exhibits no discharge. Left eye  exhibits no discharge. No scleral icterus.   Cardiovascular: Normal rate, regular rhythm and normal heart sounds.    Pulmonary/Chest: Effort normal and breath sounds normal. No respiratory distress. She has no wheezes. She has no rales.   Abdominal: Soft. Bowel sounds are normal. She exhibits no distension. There is no tenderness.   Neurological: She is alert and oriented to person, place, and time.   Skin: Skin is warm and dry. She is not diaphoretic.   Nursing note and vitals reviewed.        Current Outpatient Prescriptions:   •  carvedilol (COREG) 6.25 MG tablet, Take 0.5 tablets by mouth 2 (Two) Times a Day With Meals., Disp: 180 tablet, Rfl: 3  •  chlorothiazide (DIURIL) 250 MG tablet, Take 1 tablet by mouth Every Morning., Disp: 90 tablet, Rfl: 3  •  furosemide (LASIX) 20 MG tablet, Take one tablet twice a day or as directed (Patient taking differently: 10 mg Daily. Take one tablet twice a day or as directed), Disp: 200 tablet, Rfl: 3  •  mirtazapine (REMERON) 7.5 MG tablet, TAKE 1 TABLET BY MOUTH EVERY NIGHT, Disp: 90 tablet, Rfl: 0  •  pilocarpine (PILOCAR) 2 % ophthalmic solution, , Disp: , Rfl:   •  warfarin (COUMADIN) 5 MG tablet, 1 tab on Saturday, Sunday, Monday, and Tuesday. 1/2 tab on Wednesday, Thursday, and Friday., Disp: 90 tablet, Rfl: 2  •  Doxepin HCl 6 MG tablet, Take 1 tablet by mouth Every Night., Disp: 30 tablet, Rfl: 5    Current Facility-Administered Medications:   •  triamcinolone acetonide (KENALOG-40) injection 40 mg, 40 mg, Intra-articular, Once, [facility]    Procedures    Lab Results (most recent)     None          Assessment/Plan   Christiano was seen today for follow-up, suspicious skin lesion and edema.    Diagnoses and all orders for this visit:    Persistent atrial fibrillation  -     POC Protime / INR    Primary insomnia    Anxiety    Extensive conversation had with the patient.  Her INR was 1.8.  We will increase her dose to 5 mg on Wednesday Thursday Friday and Saturday  with 2.5 mg the rest of the week.  We'll recheck in 2 weeks.    Advised patient to fill the prescription for mirtazapine.  I am confident that this medication once titrated will benefit her greatly as we can treat both her insomnia and her depression and anxiety.    Return in about 1 week (around 3/16/2018) for Recheck.      Randall Mccoy MD

## 2018-03-25 DIAGNOSIS — Z79.899 HIGH RISK MEDICATION USE: ICD-10-CM

## 2018-03-25 DIAGNOSIS — Z79.01 CHRONIC ANTICOAGULATION: ICD-10-CM

## 2018-03-25 DIAGNOSIS — I48.19 PERSISTENT ATRIAL FIBRILLATION (HCC): ICD-10-CM

## 2018-03-26 RX ORDER — WARFARIN SODIUM 5 MG/1
TABLET ORAL
Qty: 30 TABLET | Refills: 3 | Status: SHIPPED | OUTPATIENT
Start: 2018-03-26 | End: 2018-08-26 | Stop reason: SDUPTHER

## 2018-04-23 ENCOUNTER — OFFICE VISIT (OUTPATIENT)
Dept: CARDIOLOGY | Facility: CLINIC | Age: 83
End: 2018-04-23

## 2018-04-23 VITALS
HEIGHT: 64 IN | WEIGHT: 140.8 LBS | HEART RATE: 57 BPM | BODY MASS INDEX: 24.04 KG/M2 | DIASTOLIC BLOOD PRESSURE: 76 MMHG | SYSTOLIC BLOOD PRESSURE: 154 MMHG

## 2018-04-23 DIAGNOSIS — I48.19 PERSISTENT ATRIAL FIBRILLATION (HCC): Primary | ICD-10-CM

## 2018-04-23 DIAGNOSIS — I10 ESSENTIAL HYPERTENSION: ICD-10-CM

## 2018-04-23 PROCEDURE — 99213 OFFICE O/P EST LOW 20 MIN: CPT | Performed by: INTERNAL MEDICINE

## 2018-04-23 PROCEDURE — 93000 ELECTROCARDIOGRAM COMPLETE: CPT | Performed by: INTERNAL MEDICINE

## 2018-04-23 RX ORDER — POTASSIUM CHLORIDE 750 MG/1
10 TABLET, FILM COATED, EXTENDED RELEASE ORAL
COMMUNITY
End: 2018-09-21

## 2018-05-02 ENCOUNTER — TELEPHONE (OUTPATIENT)
Dept: CARDIOLOGY | Facility: CLINIC | Age: 83
End: 2018-05-02

## 2018-07-07 DIAGNOSIS — F51.01 PRIMARY INSOMNIA: ICD-10-CM

## 2018-07-07 DIAGNOSIS — F41.0 PANIC ATTACKS: ICD-10-CM

## 2018-07-09 DIAGNOSIS — F41.0 PANIC ATTACKS: ICD-10-CM

## 2018-07-09 DIAGNOSIS — F51.01 PRIMARY INSOMNIA: ICD-10-CM

## 2018-07-09 RX ORDER — MIRTAZAPINE 7.5 MG/1
7.5 TABLET, FILM COATED ORAL NIGHTLY
Qty: 30 TABLET | Refills: 0 | Status: SHIPPED | OUTPATIENT
Start: 2018-07-09 | End: 2018-07-09 | Stop reason: SDUPTHER

## 2018-07-09 RX ORDER — MIRTAZAPINE 7.5 MG/1
7.5 TABLET, FILM COATED ORAL NIGHTLY
Qty: 90 TABLET | Refills: 0 | Status: SHIPPED | OUTPATIENT
Start: 2018-07-09 | End: 2018-09-10 | Stop reason: SDUPTHER

## 2018-08-26 DIAGNOSIS — Z79.899 HIGH RISK MEDICATION USE: ICD-10-CM

## 2018-08-26 DIAGNOSIS — I48.19 PERSISTENT ATRIAL FIBRILLATION (HCC): ICD-10-CM

## 2018-08-26 DIAGNOSIS — Z79.01 CHRONIC ANTICOAGULATION: ICD-10-CM

## 2018-08-27 DIAGNOSIS — Z79.01 CHRONIC ANTICOAGULATION: ICD-10-CM

## 2018-08-27 DIAGNOSIS — Z79.899 HIGH RISK MEDICATION USE: ICD-10-CM

## 2018-08-27 DIAGNOSIS — I48.19 PERSISTENT ATRIAL FIBRILLATION (HCC): ICD-10-CM

## 2018-08-27 RX ORDER — WARFARIN SODIUM 5 MG/1
TABLET ORAL
Qty: 90 TABLET | Refills: 0 | Status: SHIPPED | OUTPATIENT
Start: 2018-08-27 | End: 2018-12-15 | Stop reason: SDUPTHER

## 2018-08-27 RX ORDER — WARFARIN SODIUM 5 MG/1
TABLET ORAL
Qty: 30 TABLET | Refills: 0 | Status: SHIPPED | OUTPATIENT
Start: 2018-08-27 | End: 2018-08-27 | Stop reason: SDUPTHER

## 2018-09-10 ENCOUNTER — OFFICE VISIT (OUTPATIENT)
Dept: FAMILY MEDICINE CLINIC | Facility: CLINIC | Age: 83
End: 2018-09-10

## 2018-09-10 VITALS
WEIGHT: 138 LBS | SYSTOLIC BLOOD PRESSURE: 124 MMHG | RESPIRATION RATE: 14 BRPM | HEART RATE: 61 BPM | OXYGEN SATURATION: 96 % | BODY MASS INDEX: 23.56 KG/M2 | DIASTOLIC BLOOD PRESSURE: 62 MMHG | HEIGHT: 64 IN | TEMPERATURE: 98.2 F

## 2018-09-10 DIAGNOSIS — D69.9 ANTICOAGULANT DISORDER (HCC): Primary | ICD-10-CM

## 2018-09-10 DIAGNOSIS — F41.0 PANIC ATTACKS: ICD-10-CM

## 2018-09-10 DIAGNOSIS — K21.00 REFLUX ESOPHAGITIS: ICD-10-CM

## 2018-09-10 DIAGNOSIS — F51.01 PRIMARY INSOMNIA: ICD-10-CM

## 2018-09-10 DIAGNOSIS — R54 FRAILTY: ICD-10-CM

## 2018-09-10 DIAGNOSIS — R53.81 DEBILITY: ICD-10-CM

## 2018-09-10 LAB — INR PPP: 2.8 (ref 0.9–1.1)

## 2018-09-10 PROCEDURE — 85610 PROTHROMBIN TIME: CPT | Performed by: FAMILY MEDICINE

## 2018-09-10 PROCEDURE — 36416 COLLJ CAPILLARY BLOOD SPEC: CPT | Performed by: FAMILY MEDICINE

## 2018-09-10 PROCEDURE — 99214 OFFICE O/P EST MOD 30 MIN: CPT | Performed by: FAMILY MEDICINE

## 2018-09-10 RX ORDER — MIRTAZAPINE 7.5 MG/1
7.5 TABLET, FILM COATED ORAL NIGHTLY
Qty: 30 TABLET | Refills: 0 | Status: SHIPPED | OUTPATIENT
Start: 2018-09-10 | End: 2018-09-28 | Stop reason: SDUPTHER

## 2018-09-10 NOTE — PROGRESS NOTES
Christiano Bryant is a 89 y.o. female.     Chief Complaint   Patient presents with   • Depression     Patient neds to discuss her depression and anxiety. She has not been taking her Mirtazapine.    • Coagulation Disorder     She needs to get her INR chedcked.   • Edema     Patient has edema in both ankles.        HPI     Patient presents the office today to discuss a number of issues.  Patient has a history of atrial fibrillation for which she takes Coumadin.  Currently taking 2.5 mg daily 5 days a week and 5 mg 2 days a week.  INR today is 2.8.  No signs or symptoms of bleeding.  Patient also suffers from and insomnia.  She previously was using Xanax for this.  We will likely were able to wean her off.  She has previously been prescribed mirtazapine with hopes of helping her both with some anxiety issues as well as her insomnia.  Patient admits today to not taking this medication.  She is also tried doxepin 6 mg in the past with limited benefit.  Mostly because patient would not take it on a regular basis.  Patient also states that every now and then she'll have these episodes where when she lays down in bed she will feel like she can't breathe and will have to get up.  Her family states that they think they're panic attacks.  Patient states that she's not overly stressed about anything and not thinking anything at the time.  It does not happen when she is in her recliner.  Does have a history of reflux disease but is not being treated at this time.  Patient also seems to be more frail recently.  She does suffer from debility and uses a cane.  Her daughter who comes with her to appointments request for some physical therapy to see about strengthening her and doing some gait training.    The following portions of the patient's history were reviewed and updated as appropriate: allergies, current medications, past family history, past medical history, past social history, past surgical history and problem  list.    Review of Systems   Psychiatric/Behavioral: Positive for sleep disturbance. The patient is nervous/anxious.    All other systems reviewed and are negative.      Objective  Vitals:    09/10/18 1543   BP: 124/62   Pulse: 61   Resp: 14   Temp: 98.2 °F (36.8 °C)   SpO2: 96%       Physical Exam   Constitutional: She is oriented to person, place, and time. She appears well-developed and well-nourished. No distress.   HENT:   Head: Normocephalic and atraumatic.   Right Ear: External ear normal.   Left Ear: External ear normal.   Nose: Nose normal.   Mouth/Throat: Oropharynx is clear and moist.   Eyes: Pupils are equal, round, and reactive to light. Conjunctivae and EOM are normal. Right eye exhibits no discharge. Left eye exhibits no discharge. No scleral icterus.   Cardiovascular: Normal rate, regular rhythm and normal heart sounds.    Pulmonary/Chest: Effort normal and breath sounds normal. No respiratory distress. She has no wheezes. She has no rales.   Abdominal: Soft. Bowel sounds are normal. She exhibits no distension. There is no tenderness.   Neurological: She is alert and oriented to person, place, and time.   Skin: Skin is warm and dry. She is not diaphoretic.   Nursing note and vitals reviewed.        Current Outpatient Prescriptions:   •  carvedilol (COREG) 6.25 MG tablet, Take 0.5 tablets by mouth 2 (Two) Times a Day With Meals., Disp: 180 tablet, Rfl: 3  •  chlorothiazide (DIURIL) 250 MG tablet, Take 1 tablet by mouth Every Morning., Disp: 90 tablet, Rfl: 3  •  furosemide (LASIX) 20 MG tablet, Take one tablet twice a day or as directed (Patient taking differently: 10 mg Daily. Take one tablet twice a day or as directed), Disp: 200 tablet, Rfl: 3  •  pilocarpine (PILOCAR) 2 % ophthalmic solution, Daily., Disp: , Rfl:   •  potassium chloride (K-DUR) 10 MEQ CR tablet, Take 10 mEq by mouth. Daily as directed, Disp: , Rfl:   •  warfarin (COUMADIN) 5 MG tablet, TAKE 1 TABLET BY MOUTH EVERY DAY, Disp: 90  tablet, Rfl: 0  •  esomeprazole (NEXIUM) 20 MG capsule, Take 1 capsule by mouth every night at bedtime., Disp: 30 capsule, Rfl: 0  •  mirtazapine (REMERON) 7.5 MG tablet, Take 1 tablet by mouth Every Night., Disp: 30 tablet, Rfl: 0    Current Facility-Administered Medications:   •  triamcinolone acetonide (KENALOG-40) injection 40 mg, 40 mg, Intra-articular, Once, Gurwinder Waldrop APRN    Procedures    Lab Results (most recent)     None              Christiano was seen today for depression, coagulation disorder and edema.    Diagnoses and all orders for this visit:    Anticoagulant disorder (CMS/HCC)  -     POC Protime / INR    Primary insomnia  -     mirtazapine (REMERON) 7.5 MG tablet; Take 1 tablet by mouth Every Night.    Panic attacks  -     mirtazapine (REMERON) 7.5 MG tablet; Take 1 tablet by mouth Every Night.    Debility  -     Ambulatory Referral to Physical Therapy    Frailty  -     Ambulatory Referral to Physical Therapy    Reflux esophagitis  -     esomeprazole (NEXIUM) 20 MG capsule; Take 1 capsule by mouth every night at bedtime.      Extensive conversation had with the patient regarding her above issues.  INR 2.8 today.  No changes needed.  I advised the patient to go ahead and take the mirtazapine 1 tablet nightly.  Refill placed.  Also will add some Nexium to be taken at night.  There is a chance that this might be some reflux it's causing her to feel like that.  We'll try for a solid month.  The differential does include panic attacks.  I will not be writing benzodiazepines however for this patient.  We will also get physical therapy due to her debility and frailty.  Hopefully some strength training will help.    Return in about 4 weeks (around 10/8/2018) for Recheck.      Randall Mccoy MD

## 2018-09-26 ENCOUNTER — TELEPHONE (OUTPATIENT)
Dept: FAMILY MEDICINE CLINIC | Facility: CLINIC | Age: 83
End: 2018-09-26

## 2018-09-26 NOTE — TELEPHONE ENCOUNTER
Pt had her pharmacy call us to tell us that she is wheezing. I told the pharmacy to inform pt that Dr. Mccoy is gone for the day and that I advise her to go to [facility]. Pt declined. I told pt that I would rely the message that she is requesting an inhaler but could not give her a time frame of when she would get a call back or even a prescription without being seen.

## 2018-09-27 NOTE — TELEPHONE ENCOUNTER
I recommend that the patient be seen.  If unavailable to get into our office she should go to an urgent care or emergency department.

## 2018-09-28 ENCOUNTER — TELEPHONE (OUTPATIENT)
Dept: FAMILY MEDICINE CLINIC | Facility: CLINIC | Age: 83
End: 2018-09-28

## 2018-09-28 DIAGNOSIS — F51.01 PRIMARY INSOMNIA: ICD-10-CM

## 2018-09-28 DIAGNOSIS — F41.0 PANIC ATTACKS: ICD-10-CM

## 2018-09-28 RX ORDER — MIRTAZAPINE 15 MG/1
15 TABLET, FILM COATED ORAL NIGHTLY
Qty: 30 TABLET | Refills: 0 | Status: SHIPPED | OUTPATIENT
Start: 2018-09-28 | End: 2018-09-28 | Stop reason: SDUPTHER

## 2018-09-28 RX ORDER — MIRTAZAPINE 15 MG/1
15 TABLET, FILM COATED ORAL NIGHTLY
Qty: 90 TABLET | Refills: 0 | Status: SHIPPED | OUTPATIENT
Start: 2018-09-28 | End: 2018-12-26 | Stop reason: SDUPTHER

## 2018-09-28 NOTE — TELEPHONE ENCOUNTER
Pt's daughter left message saying that her mother is still having SOB she is waking up around 4 am with SOB she said it is happening to her through out the day too several time. Her anxiety is getting worse. Daughter is so concerned if this is anxiety causing her sob or she has other pulmonary issues causing it. She needs to have KASH tamez proscribing for her mother something stronger for anxiety , antidepressant or nebulizer to help her with her SOB episodes.       Please advise!!!

## 2018-09-28 NOTE — TELEPHONE ENCOUNTER
Please contact the patient and have her start taking 2 tablets of the antianxiety medication that I prescribed.  She is to take 2 tablets of the 7.5 nightly until they're gone.  I have called in a prescription for the increased dose to the patient's pharmacy.  Lets see if this helps.

## 2018-10-08 ENCOUNTER — TELEPHONE (OUTPATIENT)
Dept: FAMILY MEDICINE CLINIC | Facility: CLINIC | Age: 83
End: 2018-10-08

## 2018-10-08 NOTE — TELEPHONE ENCOUNTER
Pt was seen at [facility] on 10/8 for bilateral lower extremity swelling. Scheduled to come in on 10/12. Pt has a physical therapy appointment this afternoon. Should pt go?

## 2018-10-12 ENCOUNTER — OFFICE VISIT (OUTPATIENT)
Dept: FAMILY MEDICINE CLINIC | Facility: CLINIC | Age: 83
End: 2018-10-12

## 2018-10-12 VITALS
TEMPERATURE: 97.8 F | HEART RATE: 60 BPM | BODY MASS INDEX: 24.24 KG/M2 | HEIGHT: 64 IN | OXYGEN SATURATION: 94 % | DIASTOLIC BLOOD PRESSURE: 78 MMHG | SYSTOLIC BLOOD PRESSURE: 154 MMHG | WEIGHT: 142 LBS

## 2018-10-12 DIAGNOSIS — R60.0 BILATERAL LOWER EXTREMITY EDEMA: ICD-10-CM

## 2018-10-12 DIAGNOSIS — I50.9 ACUTE CONGESTIVE HEART FAILURE, UNSPECIFIED HEART FAILURE TYPE (HCC): Primary | ICD-10-CM

## 2018-10-12 PROCEDURE — 99214 OFFICE O/P EST MOD 30 MIN: CPT | Performed by: FAMILY MEDICINE

## 2018-10-12 NOTE — PROGRESS NOTES
Christiano Bryant is a 89 y.o. female.     Chief Complaint   Patient presents with   • Edema     swelling in ankles        HPI     Patient presents the office today after being seen in the emergency department at [facility] for shortness of breath and bilateral lower extremity swelling.  According to records that were reviewed today patient was diagnosed with acute congestive heart failure.  Was seen by a cardiologist.  Increased her Lasix and adjusted her carvedilol.  Other workup was negative.  Patient was given IV Lasix in the ER and was able to draw off quite a bit of fluid.  Currently she is taking 40 mg daily of Lasix as well as 6.25 mg her carvedilol twice a day.  Denies any current chest pain or shortness of breath.  Leg swelling has improved but she states that there is still some.    The following portions of the patient's history were reviewed and updated as appropriate: allergies, current medications, past family history, past medical history, past social history, past surgical history and problem list.    Review of Systems   Constitutional: Positive for fatigue.   Cardiovascular: Positive for leg swelling.   Musculoskeletal: Positive for arthralgias, gait problem and myalgias.   Neurological: Positive for weakness.       Objective  Vitals:    10/12/18 0751   BP: 154/78   Pulse: 60   Temp: 97.8 °F (36.6 °C)   SpO2: 94%       Physical Exam   Constitutional: She is oriented to person, place, and time. She appears well-developed and well-nourished. No distress.   HENT:   Head: Normocephalic and atraumatic.   Right Ear: External ear normal.   Left Ear: External ear normal.   Nose: Nose normal.   Mouth/Throat: Oropharynx is clear and moist.   Eyes: Pupils are equal, round, and reactive to light. Conjunctivae and EOM are normal. Right eye exhibits no discharge. Left eye exhibits no discharge. No scleral icterus.   Cardiovascular: Normal rate, regular rhythm and normal heart sounds.    Pulmonary/Chest: Effort normal  and breath sounds normal. No respiratory distress. She has no wheezes. She has no rales.   Abdominal: Soft. Bowel sounds are normal. She exhibits no distension. There is no tenderness.   Neurological: She is alert and oriented to person, place, and time.   Skin: Skin is warm and dry. She is not diaphoretic.   Nursing note and vitals reviewed.        Current Outpatient Prescriptions:   •  chlorothiazide (DIURIL) 250 MG tablet, Take 1 tablet by mouth Every Morning., Disp: 90 tablet, Rfl: 3  •  furosemide (LASIX) 20 MG tablet, Take one tablet twice a day or as directed (Patient taking differently: 10 mg Daily. Take one tablet twice a day or as directed), Disp: 200 tablet, Rfl: 3  •  mirtazapine (REMERON) 15 MG tablet, TAKE 1 TABLET BY MOUTH EVERY NIGHT, Disp: 90 tablet, Rfl: 0  •  pilocarpine (PILOCAR) 2 % ophthalmic solution, Daily., Disp: , Rfl:   •  warfarin (COUMADIN) 5 MG tablet, TAKE 1 TABLET BY MOUTH EVERY DAY, Disp: 90 tablet, Rfl: 0  •  carvedilol (COREG) 6.25 MG tablet, Take 0.5 tablets by mouth 2 (Two) Times a Day With Meals. (Patient taking differently: Take 6.25 mg by mouth 2 (Two) Times a Day With Meals.), Disp: 180 tablet, Rfl: 3  •  esomeprazole (nexIUM) 20 MG capsule, TAKE 1 CAPSULE BY MOUTH EVERY NIGHT AT BEDTIME, Disp: 90 capsule, Rfl: 0  Current outpatient and discharge medications have been reconciled for the patient.  Reviewed by: Randall Mccoy MD      Procedures    Lab Results (most recent)     None                  Christiano was seen today for edema.    Diagnoses and all orders for this visit:    Acute congestive heart failure, unspecified heart failure type (CMS/HCC)    Bilateral lower extremity edema    Extensive conversation and chart review done regarding her ER visit.  I reviewed the labs, imaging, and notes.  Advised continued use of the medication as prescribed.  Lasix 40 mg daily.  Continue carvedilol.  We'll monitor closely.  Patient will return in 4 weeks for recheck and Medicare wellness  visit.      Return in about 4 weeks (around 11/9/2018) for Medicare wellness exam.      Randall Mccoy MD

## 2018-11-01 ENCOUNTER — OFFICE VISIT (OUTPATIENT)
Dept: FAMILY MEDICINE CLINIC | Facility: CLINIC | Age: 83
End: 2018-11-01

## 2018-11-01 VITALS
OXYGEN SATURATION: 98 % | HEIGHT: 64 IN | DIASTOLIC BLOOD PRESSURE: 68 MMHG | TEMPERATURE: 98.3 F | SYSTOLIC BLOOD PRESSURE: 132 MMHG | BODY MASS INDEX: 24.65 KG/M2 | HEART RATE: 78 BPM | RESPIRATION RATE: 14 BRPM | WEIGHT: 144.4 LBS

## 2018-11-01 DIAGNOSIS — N18.30 STAGE III CHRONIC KIDNEY DISEASE (HCC): ICD-10-CM

## 2018-11-01 DIAGNOSIS — R73.09 ELEVATED GLUCOSE: ICD-10-CM

## 2018-11-01 DIAGNOSIS — F41.9 ANXIETY: ICD-10-CM

## 2018-11-01 DIAGNOSIS — R53.83 FATIGUE, UNSPECIFIED TYPE: ICD-10-CM

## 2018-11-01 DIAGNOSIS — F51.01 PRIMARY INSOMNIA: Primary | ICD-10-CM

## 2018-11-01 DIAGNOSIS — I10 ESSENTIAL HYPERTENSION: ICD-10-CM

## 2018-11-01 DIAGNOSIS — E78.5 DYSLIPIDEMIA: ICD-10-CM

## 2018-11-01 PROCEDURE — 99214 OFFICE O/P EST MOD 30 MIN: CPT | Performed by: FAMILY MEDICINE

## 2018-11-01 NOTE — PROGRESS NOTES
Christiano Bryant is a 89 y.o. female.     Chief Complaint   Patient presents with   • Insomnia     Patient is following up on insomnia and sob.She is having anxiety severly.   • Thyroid Problem     Patient needs to get it checked.       HPI     Patient presents to the office today to follow-up on a number of issues.  Patient continues to struggle with insomnia.  She states that she is taking the mirtazapine as well as Belsomra on alternating nights.  This was not necessarily as prescribed.  Patient states that she will lie down and within minutes she has to sit up gasping for air.  She has no issues gasping for air with physical activity during the day.  Extensive physical exams been performed showing no abnormalities.  Patient also has a history of anxiety, hypertension, kidney disease, hyperlipidemia, elevated glucose levels, and his fatigue.  Most likely her fatigue is secondary to her insomnia.    The following portions of the patient's history were reviewed and updated as appropriate: allergies, current medications, past family history, past medical history, past social history, past surgical history and problem list.    Review of Systems   Constitutional: Positive for fatigue.   Psychiatric/Behavioral: Positive for sleep disturbance. The patient is nervous/anxious.    All other systems reviewed and are negative.      Objective  Vitals:    11/01/18 1436   BP: 132/68   Pulse: 78   Resp: 14   Temp: 98.3 °F (36.8 °C)   SpO2: 98%       Physical Exam   Constitutional: She is oriented to person, place, and time. She appears well-developed and well-nourished. No distress.   HENT:   Head: Normocephalic and atraumatic.   Right Ear: External ear normal.   Left Ear: External ear normal.   Nose: Nose normal.   Mouth/Throat: Oropharynx is clear and moist.   Eyes: Pupils are equal, round, and reactive to light. Conjunctivae and EOM are normal. Right eye exhibits no discharge. Left eye exhibits no discharge. No scleral  icterus.   Cardiovascular: Normal rate, regular rhythm and normal heart sounds.    Pulmonary/Chest: Effort normal and breath sounds normal. No respiratory distress. She has no wheezes. She has no rales.   Abdominal: Soft. Bowel sounds are normal. She exhibits no distension. There is no tenderness.   Neurological: She is alert and oriented to person, place, and time.   Skin: Skin is warm and dry. She is not diaphoretic.   Nursing note and vitals reviewed.        Current Outpatient Prescriptions:   •  carvedilol (COREG) 6.25 MG tablet, Take 0.5 tablets by mouth 2 (Two) Times a Day With Meals. (Patient taking differently: Take 6.25 mg by mouth 2 (Two) Times a Day With Meals.), Disp: 180 tablet, Rfl: 3  •  chlorothiazide (DIURIL) 250 MG tablet, Take 1 tablet by mouth Every Morning., Disp: 90 tablet, Rfl: 3  •  esomeprazole (nexIUM) 20 MG capsule, TAKE 1 CAPSULE BY MOUTH EVERY NIGHT AT BEDTIME, Disp: 90 capsule, Rfl: 0  •  furosemide (LASIX) 20 MG tablet, Take one tablet twice a day or as directed (Patient taking differently: 10 mg Daily. Take one tablet twice a day or as directed), Disp: 200 tablet, Rfl: 3  •  mirtazapine (REMERON) 15 MG tablet, TAKE 1 TABLET BY MOUTH EVERY NIGHT, Disp: 90 tablet, Rfl: 0  •  warfarin (COUMADIN) 5 MG tablet, TAKE 1 TABLET BY MOUTH EVERY DAY, Disp: 90 tablet, Rfl: 0  •  pilocarpine (PILOCAR) 2 % ophthalmic solution, Daily., Disp: , Rfl:   Current outpatient and discharge medications have been reconciled for the patient.  Reviewed by: Randall Mccoy MD      Procedures    Lab Results (most recent)     None                  Christiano was seen today for insomnia and thyroid problem.    Diagnoses and all orders for this visit:    Primary insomnia  -     CBC Auto Differential  -     Comprehensive Metabolic Panel  -     Hemoglobin A1c  -     Lipid Panel  -     Thyroid Panel With TSH  -     Ambulatory Referral to Sleep Medicine    Anxiety  -     CBC Auto Differential  -     Comprehensive Metabolic  Panel  -     Hemoglobin A1c  -     Lipid Panel  -     Thyroid Panel With TSH    Essential hypertension  -     CBC Auto Differential  -     Comprehensive Metabolic Panel  -     Hemoglobin A1c  -     Lipid Panel  -     Thyroid Panel With TSH    Stage III chronic kidney disease (CMS/HCC)  -     CBC Auto Differential  -     Comprehensive Metabolic Panel  -     Hemoglobin A1c  -     Lipid Panel  -     Thyroid Panel With TSH    Dyslipidemia  -     CBC Auto Differential  -     Comprehensive Metabolic Panel  -     Hemoglobin A1c  -     Lipid Panel  -     Thyroid Panel With TSH    Elevated glucose  -     CBC Auto Differential  -     Comprehensive Metabolic Panel  -     Hemoglobin A1c  -     Lipid Panel  -     Thyroid Panel With TSH    Fatigue, unspecified type  -     CBC Auto Differential  -     Comprehensive Metabolic Panel  -     Hemoglobin A1c  -     Lipid Panel  -     Thyroid Panel With TSH  -     Ambulatory Referral to Sleep Medicine     Extensive discussion had with the patient regarding her symptoms.  I advised that we've come to the point where we may need to refer to a specialist.  Patient is agreeable.  We'll refer to sleep medicine.  We'll get lab work as above to search for underlying causes and also evaluate the status of these chronic medical problems.  We'll adjust treatment plan accordingly.      Return in about 2 weeks (around 11/15/2018) for Recheck.      Randall Mccoy MD

## 2018-11-02 LAB
ALBUMIN SERPL-MCNC: 4.2 G/DL (ref 3.5–5.2)
ALBUMIN/GLOB SERPL: 1.8 G/DL
ALP SERPL-CCNC: 118 U/L (ref 39–117)
ALT SERPL-CCNC: 21 U/L (ref 1–33)
AST SERPL-CCNC: 24 U/L (ref 1–32)
BASOPHILS # BLD AUTO: 0.01 10*3/MM3 (ref 0–0.2)
BASOPHILS NFR BLD AUTO: 0.2 % (ref 0–1.5)
BILIRUB SERPL-MCNC: 1.2 MG/DL (ref 0.1–1.2)
BUN SERPL-MCNC: 31 MG/DL (ref 8–23)
BUN/CREAT SERPL: 24.6 (ref 7–25)
CALCIUM SERPL-MCNC: 10.7 MG/DL (ref 8.2–9.6)
CHLORIDE SERPL-SCNC: 96 MMOL/L (ref 98–107)
CHOLEST SERPL-MCNC: 127 MG/DL (ref 0–200)
CO2 SERPL-SCNC: 31.4 MMOL/L (ref 22–29)
CREAT SERPL-MCNC: 1.26 MG/DL (ref 0.57–1)
EOSINOPHIL # BLD AUTO: 0.08 10*3/MM3 (ref 0–0.7)
EOSINOPHIL NFR BLD AUTO: 1.4 % (ref 0.3–6.2)
ERYTHROCYTE [DISTWIDTH] IN BLOOD BY AUTOMATED COUNT: 15.3 % (ref 11.7–13)
FT4I SERPL CALC-MCNC: 2.7 (ref 1.2–4.9)
GLOBULIN SER CALC-MCNC: 2.4 GM/DL
GLUCOSE SERPL-MCNC: 101 MG/DL (ref 65–99)
HBA1C MFR BLD: 5.79 % (ref 4.8–5.6)
HCT VFR BLD AUTO: 44.4 % (ref 35.6–45.5)
HDLC SERPL-MCNC: 48 MG/DL (ref 40–60)
HGB BLD-MCNC: 12.4 G/DL (ref 11.9–15.5)
IMM GRANULOCYTES # BLD: 0 10*3/MM3 (ref 0–0.03)
IMM GRANULOCYTES NFR BLD: 0 % (ref 0–0.5)
LDLC SERPL CALC-MCNC: 64 MG/DL (ref 0–100)
LYMPHOCYTES # BLD AUTO: 0.82 10*3/MM3 (ref 0.9–4.8)
LYMPHOCYTES NFR BLD AUTO: 14.5 % (ref 19.6–45.3)
MCH RBC QN AUTO: 27.8 PG (ref 26.9–32)
MCHC RBC AUTO-ENTMCNC: 27.9 G/DL (ref 32.4–36.3)
MCV RBC AUTO: 99.6 FL (ref 80.5–98.2)
MONOCYTES # BLD AUTO: 0.54 10*3/MM3 (ref 0.2–1.2)
MONOCYTES NFR BLD AUTO: 9.5 % (ref 5–12)
NEUTROPHILS # BLD AUTO: 4.21 10*3/MM3 (ref 1.9–8.1)
NEUTROPHILS NFR BLD AUTO: 74.4 % (ref 42.7–76)
PLATELET # BLD AUTO: 105 10*3/MM3 (ref 140–500)
POTASSIUM SERPL-SCNC: 4.4 MMOL/L (ref 3.5–5.2)
PROT SERPL-MCNC: 6.6 G/DL (ref 6–8.5)
RBC # BLD AUTO: 4.46 10*6/MM3 (ref 3.9–5.2)
SODIUM SERPL-SCNC: 138 MMOL/L (ref 136–145)
T3RU NFR SERPL: 38 % (ref 24–39)
T4 SERPL-MCNC: 7.2 UG/DL (ref 4.5–12)
TRIGL SERPL-MCNC: 73 MG/DL (ref 0–150)
TSH SERPL DL<=0.005 MIU/L-ACNC: 3.6 UIU/ML (ref 0.45–4.5)
VLDLC SERPL CALC-MCNC: 14.6 MG/DL (ref 5–40)
WBC # BLD AUTO: 5.66 10*3/MM3 (ref 4.5–10.7)

## 2018-11-06 RX ORDER — CHLOROTHIAZIDE 250 MG/1
250 TABLET ORAL EVERY MORNING
Qty: 90 TABLET | Refills: 1 | Status: SHIPPED | OUTPATIENT
Start: 2018-11-06 | End: 2019-03-26

## 2018-11-12 ENCOUNTER — OFFICE VISIT (OUTPATIENT)
Dept: FAMILY MEDICINE CLINIC | Facility: CLINIC | Age: 83
End: 2018-11-12

## 2018-11-12 VITALS
RESPIRATION RATE: 14 BRPM | OXYGEN SATURATION: 96 % | HEIGHT: 64 IN | TEMPERATURE: 98.3 F | WEIGHT: 144 LBS | DIASTOLIC BLOOD PRESSURE: 48 MMHG | BODY MASS INDEX: 24.59 KG/M2 | HEART RATE: 72 BPM | SYSTOLIC BLOOD PRESSURE: 122 MMHG

## 2018-11-12 DIAGNOSIS — Z00.00 INITIAL MEDICARE ANNUAL WELLNESS VISIT: Primary | ICD-10-CM

## 2018-11-12 DIAGNOSIS — Z23 NEED FOR PNEUMOCOCCAL VACCINE: ICD-10-CM

## 2018-11-12 PROCEDURE — G0438 PPPS, INITIAL VISIT: HCPCS | Performed by: FAMILY MEDICINE

## 2018-11-12 NOTE — PATIENT INSTRUCTIONS
Medicare Wellness  Personal Prevention Plan of Service     Date of Office Visit:  2018  Encounter Provider:  Randall Mccoy MD  Place of Service:  [facility]  Patient Name: Christiano Bryant  :  3/7/1923    As part of the Medicare Wellness portion of your visit today, we are providing you with this personalized preventive plan of services (PPPS). This plan is based upon recommendations of the [place] Preventive Services Task Force (USPSTF) and the Advisory Committee on Immunization Practices (ACIP).    This lists the preventive care services that should be considered, and provides dates of when you are due. Items listed as completed are up-to-date and do not require any further intervention.    Health Maintenance   Topic Date Due   • TDAP/TD VACCINES (1 - Tdap) 1942   • ZOSTER VACCINE (2 of 2) 2006   • PNEUMOCOCCAL VACCINES (65+ LOW/MEDIUM RISK) (2 of 2 - PCV13) 10/21/2015   • MEDICARE ANNUAL WELLNESS  2017   • INFLUENZA VACCINE  Completed       Orders Placed This Encounter   Procedures   • Pneumococcal Conjugate Vaccine 13-Valent All (PCV13)       Return for As needed.

## 2018-11-12 NOTE — PROGRESS NOTES
QUICK REFERENCE INFORMATION:  The ABCs of the Annual Wellness Visit    Subsequent Medicare Wellness Visit    HEALTH RISK ASSESSMENT    3/7/1923    Recent Hospitalizations:  No hospitalization(s) within the last year..        Current Medical Providers:  Patient Care Team:  Randall Mccoy MD as PCP - General (Family Medicine)        Smoking Status:  Social History     Tobacco Use   Smoking Status Never Smoker   Smokeless Tobacco Never Used       Alcohol Consumption:  Social History     Substance and Sexual Activity   Alcohol Use Yes   • Alcohol/week: 0.6 oz   • Types: 1 Glasses of wine per week    Comment: per week/  Daily caffeine use       Depression Screen:   PHQ-2/PHQ-9 Depression Screening 11/12/2018   Little interest or pleasure in doing things 0   Feeling down, depressed, or hopeless 0   Trouble falling or staying asleep, or sleeping too much 3   Feeling tired or having little energy 3   Poor appetite or overeating 2   Feeling bad about yourself - or that you are a failure or have let yourself or your family down 0   Trouble concentrating on things, such as reading the newspaper or watching television 0   Moving or speaking so slowly that other people could have noticed. Or the opposite - being so fidgety or restless that you have been moving around a lot more than usual 0   Thoughts that you would be better off dead, or of hurting yourself in some way 0   Total Score 8   If you checked off any problems, how difficult have these problems made it for you to do your work, take care of things at home, or get along with other people? Somewhat difficult       Health Habits and Functional and Cognitive Screening:  Functional & Cognitive Status 11/12/2018   Do you have difficulty preparing food and eating? No   Do you have difficulty bathing yourself, getting dressed or grooming yourself? No   Do you have difficulty using the toilet? No   Do you have difficulty moving around from place to place? No   Do you have  trouble with steps or getting out of a bed or a chair? No   In the past year have you fallen or experienced a near fall? No   Current Diet Well Balanced Diet   Dental Exam Up to date   Eye Exam Up to date   Exercise (times per week) 0 times per week   Do you need help using the phone?  No   Are you deaf or do you have serious difficulty hearing?  No   Do you need help with transportation? No   Do you need help shopping? No   Do you need help preparing meals?  No   Do you need help with housework?  No   Do you need help with laundry? No   Do you need help taking your medications? No   Do you need help managing money? No   Do you ever drive or ride in a car without wearing a seat belt? No           Does the patient have evidence of cognitive impairment? No    Aspirin use counseling: Does not need ASA (and currently is not on it)      Recent Lab Results:  CMP:  Lab Results   Component Value Date     (H) 11/01/2018    BUN 31 (H) 11/01/2018    CREATININE 1.26 (H) 11/01/2018    EGFRIFNONA 39 (L) 11/01/2018    EGFRIFAFRI 48 (L) 11/01/2018    BCR 24.6 11/01/2018     11/01/2018    K 4.4 11/01/2018    CO2 31.4 (H) 11/01/2018    CALCIUM 10.7 (H) 11/01/2018    PROTENTOTREF 6.6 11/01/2018    ALBUMIN 4.20 11/01/2018    LABGLOBREF 2.4 11/01/2018    LABIL2 1.8 11/01/2018    BILITOT 1.2 11/01/2018    ALKPHOS 118 (H) 11/01/2018    AST 24 11/01/2018    ALT 21 11/01/2018     Lipid Panel:  Lab Results   Component Value Date    TRIG 73 11/01/2018    HDL 48 11/01/2018    VLDL 14.6 11/01/2018     HbA1c:  Lab Results   Component Value Date    HGBA1C 5.79 (H) 11/01/2018       Visual Acuity:  No exam data present    Age-appropriate Screening Schedule:  Refer to the list below for future screening recommendations based on patient's age, sex and/or medical conditions. Orders for these recommended tests are listed in the plan section. The patient has been provided with a written plan.    Health Maintenance   Topic Date Due   •  TDAP/TD VACCINES (1 - Tdap) 03/07/1942   • ZOSTER VACCINE (2 of 2) 12/06/2006   • PNEUMOCOCCAL VACCINES (65+ LOW/MEDIUM RISK) (2 of 2 - PCV13) 10/21/2015   • INFLUENZA VACCINE  Completed        Subjective   History of Present Illness    Christiano Bryant is a 89 y.o. female who presents for an Subsequent Wellness Visit.    The following portions of the patient's history were reviewed and updated as appropriate: allergies, current medications, past family history, past medical history, past social history, past surgical history and problem list.    Outpatient Medications Prior to Visit   Medication Sig Dispense Refill   • carvedilol (COREG) 6.25 MG tablet Take 0.5 tablets by mouth 2 (Two) Times a Day With Meals. (Patient taking differently: Take 6.25 mg by mouth 2 (Two) Times a Day With Meals.) 180 tablet 3   • chlorothiazide (DIURIL) 250 MG tablet TAKE 1 TABLET BY MOUTH EVERY MORNING 90 tablet 1   • esomeprazole (nexIUM) 20 MG capsule TAKE 1 CAPSULE BY MOUTH EVERY NIGHT AT BEDTIME 90 capsule 0   • furosemide (LASIX) 20 MG tablet Take one tablet twice a day or as directed (Patient taking differently: 10 mg Daily. Take one tablet twice a day or as directed) 200 tablet 3   • mirtazapine (REMERON) 15 MG tablet TAKE 1 TABLET BY MOUTH EVERY NIGHT 90 tablet 0   • pilocarpine (PILOCAR) 2 % ophthalmic solution Daily.     • warfarin (COUMADIN) 5 MG tablet TAKE 1 TABLET BY MOUTH EVERY DAY 90 tablet 0     No facility-administered medications prior to visit.        Patient Active Problem List   Diagnosis   • Hypertension   • Atrial fibrillation (CMS/HCC)   • High risk medication use   • Primary insomnia   • Back pain   • Arthritis of left knee   • Dyslipidemia   • Effusion of right knee joint   • Elevated troponin   • HTN (hypertension)   • Meningioma (CMS/HCC)   • Near syncope   • New onset atrial fibrillation (CMS/HCC)   • Stage III chronic kidney disease (CMS/HCC)   • TIA (transient ischemic attack)   • Anxiety       Advance Care  "Planning:  has NO advance directive - information provided to the patient today    Identification of Risk Factors:  Risk factors include: cardiovascular risk, inactivity, increased fall risk, vision limitations and hearing limitations.    Review of Systems   Constitutional: Negative for appetite change.   All other systems reviewed and are negative.      Compared to one year ago, the patient feels her physical health is worse.  Compared to one year ago, the patient feels her mental health is the same.    Objective     Physical Exam    Vitals:    11/12/18 1403   BP: 122/48   Pulse: 72   Resp: 14   Temp: 98.3 °F (36.8 °C)   TempSrc: Oral   SpO2: 96%   Weight: 65.3 kg (144 lb)   Height: 162.6 cm (64\")   PainSc: 0-No pain       Patient's Body mass index is 24.72 kg/m². BMI is within normal parameters. No follow-up required.      Assessment/Plan   Patient Self-Management and Personalized Health Advice  The patient has been provided with information about: diet, exercise, weight management, fall prevention and designing advance directives and preventive services including:   · Advance directive, Exercise counseling provided, Nutrition counseling provided, Pneumococcal vaccine .    Visit Diagnoses:    ICD-10-CM ICD-9-CM   1. Initial Medicare annual wellness visit Z00.00 V70.0   2. Need for pneumococcal vaccine Z23 V03.82       Orders Placed This Encounter   Procedures   • Pneumococcal Conjugate Vaccine 13-Valent All (PCV13)       Outpatient Encounter Medications as of 11/12/2018   Medication Sig Dispense Refill   • carvedilol (COREG) 6.25 MG tablet Take 0.5 tablets by mouth 2 (Two) Times a Day With Meals. (Patient taking differently: Take 6.25 mg by mouth 2 (Two) Times a Day With Meals.) 180 tablet 3   • chlorothiazide (DIURIL) 250 MG tablet TAKE 1 TABLET BY MOUTH EVERY MORNING 90 tablet 1   • esomeprazole (nexIUM) 20 MG capsule TAKE 1 CAPSULE BY MOUTH EVERY NIGHT AT BEDTIME 90 capsule 0   • furosemide (LASIX) 20 MG tablet " Take one tablet twice a day or as directed (Patient taking differently: 10 mg Daily. Take one tablet twice a day or as directed) 200 tablet 3   • mirtazapine (REMERON) 15 MG tablet TAKE 1 TABLET BY MOUTH EVERY NIGHT 90 tablet 0   • pilocarpine (PILOCAR) 2 % ophthalmic solution Daily.     • warfarin (COUMADIN) 5 MG tablet TAKE 1 TABLET BY MOUTH EVERY DAY 90 tablet 0     No facility-administered encounter medications on file as of 11/12/2018.        Reviewed use of high risk medication in the elderly: yes  Reviewed for potential of harmful drug interactions in the elderly: yes    Follow Up:  Return for As needed.     An After Visit Summary and PPPS with all of these plans were given to the patient.

## 2018-11-28 ENCOUNTER — OFFICE VISIT (OUTPATIENT)
Dept: CARDIOLOGY | Facility: CLINIC | Age: 83
End: 2018-11-28

## 2018-11-28 VITALS
SYSTOLIC BLOOD PRESSURE: 110 MMHG | WEIGHT: 139.2 LBS | HEIGHT: 64 IN | HEART RATE: 52 BPM | BODY MASS INDEX: 23.76 KG/M2 | DIASTOLIC BLOOD PRESSURE: 68 MMHG

## 2018-11-28 DIAGNOSIS — G45.9 TIA (TRANSIENT ISCHEMIC ATTACK): ICD-10-CM

## 2018-11-28 DIAGNOSIS — I48.19 PERSISTENT ATRIAL FIBRILLATION (HCC): Primary | ICD-10-CM

## 2018-11-28 DIAGNOSIS — E78.5 DYSLIPIDEMIA: ICD-10-CM

## 2018-11-28 DIAGNOSIS — I10 ESSENTIAL HYPERTENSION: ICD-10-CM

## 2018-11-28 DIAGNOSIS — N18.30 STAGE III CHRONIC KIDNEY DISEASE (HCC): ICD-10-CM

## 2018-11-28 PROCEDURE — 93000 ELECTROCARDIOGRAM COMPLETE: CPT | Performed by: NURSE PRACTITIONER

## 2018-11-28 PROCEDURE — 99214 OFFICE O/P EST MOD 30 MIN: CPT | Performed by: NURSE PRACTITIONER

## 2018-11-28 RX ORDER — CHOLECALCIFEROL (VITAMIN D3) 125 MCG
5 CAPSULE ORAL NIGHTLY
COMMUNITY

## 2018-11-28 NOTE — PROGRESS NOTES
Date of Office Visit: 2018  Encounter Provider: Zainab Perez, PAT, APRN  Place of Service: [facility] CARDIOLOGY  Patient Name: Christiano Bryant  :3/7/1923      Subjective:     Chief Complaint:  Follow-up atrial fibrillation.    History of Present Illness:  Christiano Bryant is a 89 y.o. female patient of Dr. Ellis.  This is my first time seeing this patient in the office and I have reviewed her records.    Patient has a history of hypertension, hyperlipidemia, chronic renal insufficiency, atrial fibrillation, TIA.    Patient presented in 2016 with a TIA in the setting of new onset atrial fibrillation.  She had not been on aspirin therapy at that time.  An echocardiogram showed an ejection fraction of 66% without significant valvular heart disease.  She was started on Eliquis and subsequently discharged home without plans for aspirin therapy.  However she was subsequently switched to aspirin and Coumadin.  When she was seen by Dr. Ellis 2017 the aspirin was discontinued.    Patient was last seen in office by Dr. Ellis 18.  At this point she reported that she had slowly become less active.  Her dyspnea on exertion was unchanged.  She denied any palpitations, syncope, near-syncope.  She had knee pain and mild ankle edema.  She had one fall where she had fallen backwards, though she did not sustain any injury.  Otherwise she had been ambulating relatively well.    Patient was seen in the ER 10/7/18.  She was diagnosed with acute congestive heart failure.  She presented complaining of bilateral lower extremity swelling and shortness of breath.  It has started a few days prior and has been intermittent.  She noticed the lower extremity first and then increasing shortness of breath with exertion or lying flat.  She was on Lasix and HCTZ at home, but her daughter was unsure whether she had been taking as prescribed.  She denied any fever, cough, chest pain, nausea vomiting  diarrhea, or abdominal pain.  Her daughter thought that she might only be taking half of the prescribed dose of her carvedilol.  No findings of pulmonary edema seen on chest x-ray.  Patient was given IV Lasix.  Patient felt improved and was discharged home with instructions to follow-up with primary care provider.    Patient presents to office today for 6 month follow-up appointment.  Patient's daughter is with her in the office today, per patient preference.  She lives 5 minutes away from the patient and visit her often.  Patient currently lives alone, however she reports that she has not had any recent falls and that she manages okay on her own.  She reports she has had some episodes of worsening shortness of breath and activity intolerance as well as lightheadedness with activities. Patient denies any recent falls.  She denies any abnormal bleeding.  She had a recent CBC showing no anemia and hemoglobin was normal.  Patient denies any palpitations or racing heartbeat sensation. Blood pressure in office today is well controlled at 110/68 mmHg.  I think that this lower blood pressure might be contributing to patient's increased fatigue and shortness of breath with activities.  Her heart rate is also low in the office today at 52 bpm and actually got into the 40s when she was walking.  At this point we will decrease her carvedilol dose to 3.125 mg twice a day.  Patient to monitor heart rate and blood pressure outside of the office and notify office if heart rate less than 50, above 90, or systolic blood pressure less than 100.  She also is not currently drinking any water during the day.  She will start to drink two  8 ounce glasses of water a day and possibly increase from there.  She feels like this would be a stretch for her.  She will notify us right away if she develops any worsening shortness of breath or lower extremity edema. Patient feels like her lower extremity edema is improved, and controlled at this  point.  She does have 1+ pitting edema on exam today.  She reports that her edema is much improved since her recent ER visit after missing some water pills.  At this point I am hesitant to decrease her diuretic dose.  We will keep the diuretic dose the same for right now and possibly see if she could tolerate a lower dose, such as cutting back on the chlorothiazide, at next visit if swelling remains well controlled and she is still feeling like she is having some lightheadedness.  We also discussed trying some compression socks to control her lower extremity edema, as it does sometimes bother her.  Her daughter lives 5 minutes away and says that she could help her put them on during the day and take them off before bed if patient is not able to get them on and off by herself.  We did attempt to do a 6 minute walk test the office today, however patient could not walk a full 6 minutes.  She probably walks about 4 minutes.  Oxygen level only got as low as 92%, and was 95% before starting.  We will revisit trying to do a full walk test if needed in the future.  However heart rate with walking did drop into the high 40s.  I do think that decrease in the carvedilol dose will help with this as well.  She denies any chest pain, palpitations, racing heartbeat sensation, syncope, falls, or abnormal bleeding.  She reports that in the past she did try to do some physical therapy to help improve her balance and strength, however she did not think that this was helpful and did not complete this.  She will revisit the idea of giving this another try.    Patient to follow-up with APRN in 4 weeks and MD in 4 months.  She will follow-up sooner if needed for any new or worsening symptoms or other problems/concerns.        Past Medical History:   Diagnosis Date   • Acute congestive heart failure (CMS/HCC)    • Anxiety    • Atrial fibrillation (CMS/HCC)    • CKD (chronic kidney disease), stage III (CMS/HCC)    • Dyslipidemia    •  Elevated brain natriuretic peptide (BNP) level    • Elevated troponin    • Generalized weakness    • Glaucoma    • Hypertension    • Localized swelling of both lower legs    • Meningioma (CMS/HCC)    • Near syncope    • Neuropathy    • Paresthesia    • Short of breath on exertion    • Skin cancer    • TIA (transient ischemic attack)    • Weakness of lower extremity    • Weakness of right upper extremity      Past Surgical History:   Procedure Laterality Date   • APPENDECTOMY     • EYE SURGERY     • KNEE SURGERY Left     torn miniscus repair   • OOPHORECTOMY       Outpatient Medications Prior to Visit   Medication Sig Dispense Refill   • carvedilol (COREG) 6.25 MG tablet Take 0.5 tablets by mouth 2 (Two) Times a Day With Meals. (Patient taking differently: Take 6.25 mg by mouth 2 (Two) Times a Day With Meals.) 180 tablet 3   • chlorothiazide (DIURIL) 250 MG tablet TAKE 1 TABLET BY MOUTH EVERY MORNING 90 tablet 1   • furosemide (LASIX) 20 MG tablet Take one tablet twice a day or as directed (Patient taking differently: 10 mg Daily. Take one tablet twice a day or as directed) 200 tablet 3   • melatonin 5 MG tablet tablet Take 5 mg by mouth Every Night.     • mirtazapine (REMERON) 15 MG tablet TAKE 1 TABLET BY MOUTH EVERY NIGHT 90 tablet 0   • Suvorexant (BELSOMRA PO) Take  by mouth Every Night.     • warfarin (COUMADIN) 5 MG tablet TAKE 1 TABLET BY MOUTH EVERY DAY 90 tablet 0   • esomeprazole (nexIUM) 20 MG capsule TAKE 1 CAPSULE BY MOUTH EVERY NIGHT AT BEDTIME 90 capsule 0   • pilocarpine (PILOCAR) 2 % ophthalmic solution Daily.       No facility-administered medications prior to visit.        Allergies as of 11/28/2018 - Reviewed 11/28/2018   Allergen Reaction Noted   • Penicillins Swelling 10/20/2014     Social History     Socioeconomic History   • Marital status:      Spouse name: Not on file   • Number of children: Not on file   • Years of education: Not on file   • Highest education level: Not on file    Social Needs   • Financial resource strain: Not on file   • Food insecurity - worry: Not on file   • Food insecurity - inability: Not on file   • Transportation needs - medical: Not on file   • Transportation needs - non-medical: Not on file   Occupational History   • Not on file   Tobacco Use   • Smoking status: Never Smoker   • Smokeless tobacco: Never Used   Substance and Sexual Activity   • Alcohol use: Yes     Alcohol/week: 0.6 oz     Types: 1 Glasses of wine per week     Comment: per week/  Daily caffeine use   • Drug use: No   • Sexual activity: No   Other Topics Concern   • Not on file   Social History Narrative   • Not on file     Family History   Problem Relation Age of Onset   • Angina Mother    • Cancer Father    • No Known Problems Maternal Grandmother    • No Known Problems Maternal Grandfather    • No Known Problems Paternal Grandmother    • No Known Problems Paternal Grandfather        Review of Systems   Constitution: Positive for malaise/fatigue. Negative for chills, fever, night sweats, weight gain and weight loss.   HENT: Negative for ear pain, hearing loss, nosebleeds and sore throat.    Eyes: Negative for blurred vision, double vision, redness, vision loss in left eye, vision loss in right eye and visual disturbance.   Cardiovascular: Positive for dyspnea on exertion and leg swelling.   Respiratory: Negative for cough, hemoptysis, shortness of breath, snoring and wheezing.    Endocrine: Negative for cold intolerance and heat intolerance.   Skin: Negative for itching, rash and suspicious lesions.   Musculoskeletal: Negative for joint pain, joint swelling and myalgias.   Gastrointestinal: Negative for abdominal pain, diarrhea, hematemesis, melena, nausea and vomiting.   Genitourinary: Negative for dysuria, frequency and hematuria.   Neurological: Negative for dizziness, headaches, numbness, paresthesias and seizures.   Psychiatric/Behavioral: Negative for altered mental status and depression.  "The patient is not nervous/anxious.           Objective:     Vitals:    11/28/18 1111   BP: 110/68   BP Location: Left arm   Pulse: 52   Weight: 63.1 kg (139 lb 3.2 oz)   Height: 162.6 cm (64\")     Body mass index is 23.89 kg/m².      PHYSICAL EXAM:  Physical Exam   Constitutional: She is oriented to person, place, and time. She appears well-developed and well-nourished. No distress.   HENT:   Head: Normocephalic and atraumatic.   Eyes: Pupils are equal, round, and reactive to light.   Neck: Neck supple. No JVD present. Carotid bruit is not present. No tracheal deviation present.   Cardiovascular: Normal rate, normal heart sounds and intact distal pulses. An irregularly irregular rhythm present. Exam reveals no gallop and no friction rub.   No murmur heard.  Pulses:       Radial pulses are 2+ on the right side, and 2+ on the left side.        Posterior tibial pulses are 2+ on the right side, and 2+ on the left side.   Pulmonary/Chest: Effort normal and breath sounds normal. No respiratory distress. She has no wheezes. She has no rales.   Abdominal: Soft. Bowel sounds are normal. She exhibits no distension. There is no tenderness.   Musculoskeletal: She exhibits edema (1+ pitting edema to bilateral ankles). She exhibits no tenderness or deformity.   Patient ambulates with a walker at home.  She is in a wheelchair in the office today.   Neurological: She is alert and oriented to person, place, and time.   Skin: Skin is warm and dry. No rash noted. She is not diaphoretic. No erythema.   Psychiatric: She has a normal mood and affect. Her behavior is normal. Judgment normal.           ECG 12 Lead  Date/Time: 11/28/2018 11:27 AM  Performed by: Zainab Perez DNP, APRN  Authorized by: Zainab Perez DNP, KVNG   Comparison: compared with previous ECG   Similar to previous ECG  Rhythm: atrial fibrillation  Rate: bradycardic  BPM: 52  Conduction: right bundle branch block  Other findings comments: nonspecific st-t " wave changes, similar to previous ECG  Clinical impression: abnormal ECG            Assessment:       Diagnosis Plan   1. Persistent atrial fibrillation (CMS/HCC)     2. Essential hypertension     3. TIA (transient ischemic attack)     4. Stage III chronic kidney disease (CMS/HCC)     5. Dyslipidemia           Plan:     1. Persistent atrial fibrillation: Patient continues on Coumadin therapy.  If she starts to fall then we will need to reconsider the use of the Coumadin.  Patient denies any recent falls.  She denies any abnormal bleeding.  She had a recent CBC showing no anemia and hemoglobin was normal.  Patient denies any palpitations or racing heartbeat sensation.    Atrial Fibrillation and Atrial Flutter  Assessment  • The patient has persistent atrial fibrillation  • This is non-valvular in etiology  • The patient's CHADS2-VASc score is 4  • A STV3HT3-PRGb score of 2 or more is considered a high risk for a thromboembolic event  • Warfarin prescribed    Plan  • Continue in atrial fibrillation with rate control  • Continue warfarin for antithrombotic therapy, bleeding issues discussed  • Continue beta blocker for rhythm control    2. Hypertension: Blood pressure in office today is well controlled at 110/68 mmHg.  I think that this lower blood pressure might be contributing to patient's increased fatigue and shortness of breath with activities.  Her heart rate is also low in the office today at 52 bpm and actually got into the 40s when she was walking.  At this point we will decrease her carvedilol dose to 3.125 mg twice a day.  Patient to monitor heart rate and blood pressure outside of the office and notify office if heart rate less than 50, above 90, or systolic blood pressure less than 100.  She also is not currently drinking any water during the day.  She will start to drink two  8 ounce glasses of water a day and possibly increase from there.  She feels like this would be a stretch for her.  She will notify  us right away if she develops any worsening shortness of breath or lower extremity edema.  3. Heart failure: Patient feels like her lower extremity edema is improved, and controlled at this point.  She does have 1+ pitting edema on exam today.  She reports that her edema is much improved since her recent ER visit after missing some water pills.  At this point I am hesitant to decrease her diuretic dose.  We will keep the diuretic dose the same for right now and possibly see if she could tolerate a lower dose, such as cutting back on the chlorothiazide, at next visit if swelling remains well controlled and she is still feeling like she is having some lightheadedness.  We also discussed trying some compression socks to control her lower extremity edema, as it does sometimes bother her.  Her daughter lives 5 minutes away and says that she could help her put them on during the day and take them off before bed if patient is not able to get them on and off by herself.  4. Renal insufficiency: Followed by Dr. Mccoy.  5. Hyperlipidemia: Managed by outside provider.  6. History of TIA      Patient to follow-up with APRN in 4 weeks, or sooner if needed.    She will also schedule for month follow-up appointment with Dr. Ellis.      ADDENDUM: Plan of care and EKG discussed with Dr. Caleb M.D.  If patient remains bradycardic after cutting back the Coreg then we will consider placing a Holter monitor.  It is also not felt that an echocardiogram would drastically change her plan of care at this time, however we will consider this at follow-up appointment if patient is still having issues or if she has any new or worsening symptoms before then.  Patient appears pretty euvolemic in the office today.              Your medication list           Accurate as of 11/28/18 11:26 AM. If you have any questions, ask your nurse or doctor.               CHANGE how you take these medications      Instructions Last Dose Given Next Dose Due    carvedilol 6.25 MG tablet  Commonly known as:  COREG  What changed:  how much to take      Take 0.5 tablets by mouth 2 (Two) Times a Day With Meals.       furosemide 20 MG tablet  Commonly known as:  LASIX  What changed:    · how much to take  · when to take this  · additional instructions      Take one tablet twice a day or as directed          CONTINUE taking these medications      Instructions Last Dose Given Next Dose Due   BELSOMRA PO      Take  by mouth Every Night.       chlorothiazide 250 MG tablet  Commonly known as:  DIURIL      TAKE 1 TABLET BY MOUTH EVERY MORNING       melatonin 5 MG tablet tablet      Take 5 mg by mouth Every Night.       mirtazapine 15 MG tablet  Commonly known as:  REMERON      TAKE 1 TABLET BY MOUTH EVERY NIGHT       warfarin 5 MG tablet  Commonly known as:  COUMADIN      TAKE 1 TABLET BY MOUTH EVERY DAY          STOP taking these medications    esomeprazole 20 MG capsule  Commonly known as:  nexIUM  Stopped by:  Zainab Perez DNP, APRN        pilocarpine 2 % ophthalmic solution  Commonly known as:  PILOCAR  Stopped by:  Zainab Perez DNP, APRN           I did not stop or change the above medications (with the exception of decreasing carvedilol from 6.25 mg twice a day to 3.125 mg twice a day).  Patient's medication list was updated to reflect medications she is currently taking including medication changes made by other providers.             Thanks,    Zainab Perez DNP, APRN  11/28/2018             Dictated utilizing Dragon dictation

## 2018-11-29 RX ORDER — SUVOREXANT 10 MG/1
TABLET, FILM COATED ORAL
Qty: 30 TABLET | Refills: 0 | Status: SHIPPED | OUTPATIENT
Start: 2018-11-29 | End: 2018-12-25 | Stop reason: SDUPTHER

## 2018-12-15 DIAGNOSIS — Z79.899 HIGH RISK MEDICATION USE: ICD-10-CM

## 2018-12-15 DIAGNOSIS — Z79.01 CHRONIC ANTICOAGULATION: ICD-10-CM

## 2018-12-15 DIAGNOSIS — I48.19 PERSISTENT ATRIAL FIBRILLATION (HCC): ICD-10-CM

## 2018-12-17 RX ORDER — FUROSEMIDE 20 MG/1
TABLET ORAL
Qty: 180 TABLET | Refills: 1 | Status: SHIPPED | OUTPATIENT
Start: 2018-12-17 | End: 2019-05-01

## 2018-12-17 RX ORDER — WARFARIN SODIUM 5 MG/1
TABLET ORAL
Qty: 90 TABLET | Refills: 0 | Status: SHIPPED | OUTPATIENT
Start: 2018-12-17 | End: 2019-03-22

## 2018-12-26 DIAGNOSIS — F51.01 PRIMARY INSOMNIA: ICD-10-CM

## 2018-12-26 DIAGNOSIS — F41.0 PANIC ATTACKS: ICD-10-CM

## 2018-12-26 RX ORDER — MIRTAZAPINE 15 MG/1
TABLET, FILM COATED ORAL
Qty: 90 TABLET | Refills: 0 | Status: SHIPPED | OUTPATIENT
Start: 2018-12-26 | End: 2019-05-08 | Stop reason: SDUPTHER

## 2018-12-27 RX ORDER — SUVOREXANT 10 MG/1
TABLET, FILM COATED ORAL
Qty: 30 TABLET | Refills: 3 | Status: SHIPPED | OUTPATIENT
Start: 2018-12-27 | End: 2019-04-24 | Stop reason: SDUPTHER

## 2019-01-29 ENCOUNTER — TELEPHONE (OUTPATIENT)
Dept: FAMILY MEDICINE CLINIC | Facility: CLINIC | Age: 84
End: 2019-01-29

## 2019-01-29 NOTE — TELEPHONE ENCOUNTER
Pt's daughter Meghana called and said she saw on TV that warfarin will not be proscribed any more for A FIB problems, she called pharmacy to verify this they told her that is true, she is reaching pout to Dr tamez to see what need to be done about this.

## 2019-02-08 ENCOUNTER — OFFICE VISIT (OUTPATIENT)
Dept: FAMILY MEDICINE CLINIC | Facility: CLINIC | Age: 84
End: 2019-02-08

## 2019-02-08 VITALS
TEMPERATURE: 98 F | BODY MASS INDEX: 21.54 KG/M2 | WEIGHT: 126.2 LBS | OXYGEN SATURATION: 99 % | RESPIRATION RATE: 18 BRPM | HEIGHT: 64 IN | SYSTOLIC BLOOD PRESSURE: 124 MMHG | DIASTOLIC BLOOD PRESSURE: 62 MMHG | HEART RATE: 58 BPM

## 2019-02-08 DIAGNOSIS — I48.19 PERSISTENT ATRIAL FIBRILLATION (HCC): Primary | ICD-10-CM
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
## 2019-02-08 LAB
INR PPP: 3 (ref 0.9–1.1)
PROTHROMBIN TIME: 22.2 SECONDS

## 2019-02-08 PROCEDURE — 85610 PROTHROMBIN TIME: CPT | Performed by: FAMILY MEDICINE

## 2019-02-08 PROCEDURE — 99213 OFFICE O/P EST LOW 20 MIN: CPT | Performed by: FAMILY MEDICINE

## 2019-02-08 PROCEDURE — 36416 COLLJ CAPILLARY BLOOD SPEC: CPT | Performed by: FAMILY MEDICINE

## 2019-02-11 RX ORDER — CARVEDILOL 6.25 MG/1
3.12 TABLET ORAL 2 TIMES DAILY WITH MEALS
Qty: 90 TABLET | Refills: 1 | Status: SHIPPED | OUTPATIENT
Start: 2019-02-11 | End: 2019-03-26

## 2019-03-22 ENCOUNTER — TELEPHONE (OUTPATIENT)
Dept: FAMILY MEDICINE CLINIC | Facility: CLINIC | Age: 84
End: 2019-03-22

## 2019-03-22 NOTE — TELEPHONE ENCOUNTER
Called daughter back she had asked if her mother needs to be on Asprin after she stopped warfarin , per Dr tamez note she needs to be seen by cardio end of this month and can be evaluated by him. Pt;s daughter voiced understanding she said she will check with him.

## 2019-03-26 ENCOUNTER — OFFICE VISIT (OUTPATIENT)
Dept: CARDIOLOGY | Facility: CLINIC | Age: 84
End: 2019-03-26

## 2019-03-26 VITALS
WEIGHT: 130.8 LBS | BODY MASS INDEX: 22.33 KG/M2 | HEIGHT: 64 IN | DIASTOLIC BLOOD PRESSURE: 70 MMHG | SYSTOLIC BLOOD PRESSURE: 100 MMHG | HEART RATE: 42 BPM

## 2019-03-26 DIAGNOSIS — I48.19 PERSISTENT ATRIAL FIBRILLATION (HCC): Primary | ICD-10-CM

## 2019-03-26 DIAGNOSIS — I50.22 CHRONIC SYSTOLIC CONGESTIVE HEART FAILURE (HCC): Primary | ICD-10-CM

## 2019-03-26 DIAGNOSIS — I10 ESSENTIAL HYPERTENSION: ICD-10-CM

## 2019-03-26 DIAGNOSIS — I50.9 PLEURAL EFFUSION DUE TO CHF (CONGESTIVE HEART FAILURE) (HCC): ICD-10-CM

## 2019-03-26 DIAGNOSIS — N28.9 RENAL INSUFFICIENCY: ICD-10-CM

## 2019-03-26 DIAGNOSIS — I50.23 ACUTE ON CHRONIC SYSTOLIC CHF (CONGESTIVE HEART FAILURE) (HCC): ICD-10-CM

## 2019-03-26 DIAGNOSIS — I50.32 CHRONIC DIASTOLIC CONGESTIVE HEART FAILURE (HCC): ICD-10-CM

## 2019-03-26 PROCEDURE — 93000 ELECTROCARDIOGRAM COMPLETE: CPT | Performed by: NURSE PRACTITIONER

## 2019-03-26 PROCEDURE — 99214 OFFICE O/P EST MOD 30 MIN: CPT | Performed by: NURSE PRACTITIONER

## 2019-03-26 RX ORDER — LATANOPROST 50 UG/ML
1 SOLUTION/ DROPS OPHTHALMIC DAILY
Refills: 0 | COMMUNITY
Start: 2019-03-15 | End: 2019-08-12 | Stop reason: HOSPADM

## 2019-03-26 RX ORDER — PILOCARPINE HYDROCHLORIDE 10 MG/ML
SOLUTION/ DROPS OPHTHALMIC 2 TIMES DAILY
COMMUNITY

## 2019-03-26 RX ORDER — DOCUSATE SODIUM 100 MG/1
100 CAPSULE, LIQUID FILLED ORAL AS NEEDED
COMMUNITY
End: 2020-01-27

## 2019-03-26 RX ORDER — DORZOLAMIDE HYDROCHLORIDE AND TIMOLOL MALEATE 20; 5 MG/ML; MG/ML
1 SOLUTION/ DROPS OPHTHALMIC 2 TIMES DAILY
Refills: 0 | COMMUNITY
Start: 2019-03-15 | End: 2019-08-12 | Stop reason: HOSPADM

## 2019-03-26 NOTE — PROGRESS NOTES
Date of Office Visit: 2019  Encounter Provider: Zainab Perez, PAT, APRN  Place of Service: [facility] CARDIOLOGY  Patient Name: Christiano Bryant  :3/7/1923        Subjective:     Chief Complaint:  Follow-up, hypertension, atrial fibrillation, chronic congestive heart failure, hospital follow-up.      History of Present Illness:  Christiano Bryant is a 89 y.o. female patient of Dr. Ellis.  I am seeing this patient in the office today and I have reviewed her records.    Patient has a history of hypertension, hyperlipidemia, chronic renal insufficiency, atrial fibrillation, TIA.     Patient presented in 2016 with a TIA in the setting of new onset atrial fibrillation.  She had not been on aspirin therapy at that time.  An echocardiogram showed an ejection fraction of 66% without significant valvular heart disease.  She was started on Eliquis and subsequently discharged home without plans for aspirin therapy.  However she was subsequently switched to aspirin and Coumadin.  When she was seen by Dr. Ellis 2017 the aspirin was discontinued.     Patient was last seen in office by Dr. Ellis 18.  At this point she reported that she had slowly become less active.  Her dyspnea on exertion was unchanged.  She denied any palpitations, syncope, near-syncope.  She had knee pain and mild ankle edema.  She had one fall where she had fallen backwards, though she did not sustain any injury.  Otherwise she had been ambulating relatively well.     Patient was seen in the ER 10/7/18.  She was diagnosed with acute congestive heart failure.  She presented complaining of bilateral lower extremity swelling and shortness of breath.  It has started a few days prior and has been intermittent.  She noticed the lower extremity first and then increasing shortness of breath with exertion or lying flat.  She was on Lasix and HCTZ at home, but her daughter was unsure whether she had been taking as  prescribed.  She denied any fever, cough, chest pain, nausea vomiting diarrhea, or abdominal pain.  Her daughter thought that she might only be taking half of the prescribed dose of her carvedilol.  No findings of pulmonary edema seen on chest x-ray.  Patient was given IV Lasix.  Patient felt improved and was discharged home with instructions to follow-up with primary care provider.    Patient was seen in the office by [facility] 11/2018.  At this point she denied any falls and continued on Coumadin therapy.  Denied palpitations, racing heartbeat sensation.  Did have some shortness of breath with activities and increased fatigue, and had low normal blood pressure reading.  Carvedilol dose was decreased.  Lower extremity swelling was felt to be improved.  She was followed by Dr. Mccoy for renal insufficiency.  She was instructed to follow-up with KVNG in 4 weeks and Dr. Ellis in 3 months.    Patient was in [facility] 3/2019.  She presented with fatigue, generalized weakness, shortness of breath.  For the past 2 weeks she has been sleeping all day.  She had a 15 pound weight loss since November.  Patient reported orthopnea and increased lower extremity swelling.  CT abdomen pelvis without contrast showed a mass measuring 4.3 x 3.7 x 5 cm.  She was found to have bilateral pleural effusions.  She underwent a thoracentesis on the right side with 1 L of fluid removed.  Breathing improved back to baseline after this.  Oncology was consulted.  Patient was feeling well without any acute issues related to the mass and did not have any pain from thoracentesis.  The thoracentesis was thought to be related to heart failure rather than possible cancer.  However surgery was discussed.  Oncology recommended not having aggressive treatment at that time.  He did not feel patient was a good surgical candidate or chemo candidate.  This was discussed with patient and was recommended that she follow-up with primary care provider and also  discussed with family if she wanted to pursue a more aggressive approach.  Troponin was elevated but it was suspected that it was due to demand ischemia.  There is no peak and troponin.  Cytology was pending for thoracentesis.    She had a repeat echo that showed severe concentric left ventricular hypertrophy, EF 69%, diastolic dysfunction with elevated filling pressure, moderately dilated right ventricle, mildly depressed right ventricular function, severely dilated left atrium, moderate right atrial enlargement, moderate mitral annular calcification.      Patient presents to office today for follow-up appointment.  Daughter, son, and daughter-in-law with her in the office today, per patient preference.  Patient reports she has been doing okay since hospital discharge.  She feels like her chronic shortness of breath with exertion has improved, though still present.  She also feels like her ankle swelling has improved.  She continues to take Lasix twice a day.  She reports that she will occasionally have some lightheadedness with exertion, which is mild.  Heart rate is low in the office today and blood pressure low normal.  At this point we are going to stop the carvedilol.  They will monitor heart rate and blood pressure closely.  They will call if heart rate gets above 89 bpm or if systolic blood pressure gets above 150 mmHg.  If this occurs and we will likely start a low-dose metoprolol or different blood pressure medication.  She reports that her Coumadin was previously stopped by her primary care provider due to multiple falls.  She uses a walker at home but has continued to have falls at home since hospital discharge.  Denies injuries.  She starts physical therapy this week and I highly recommended that she discuss balance issues with them.  She continues to have a lot of fatigue and family notices that she has been sleeping more.  They had noticed this before the hospitalization as well.  She denies any chest  pain, palpitations, racing heartbeat sensation, syncope, near syncope, or abnormal bleeding.  She sees primary care provider in a week and will discuss repeat chest x-ray with them.  For some reason if they do not order this she will call our office and we will follow-up on this.  She is also going to see her primary care provider can do a repeat EKG when they see her since carvedilol has now been stopped.  If they are unable to do that then we will have him schedule an EKG check with our office, however patient and family prefer to check with primary care provider first as this would save them a stop it would be more convenient for them.  She is currently having [facility] and I have given them orders to get a proBNP and a BMP checked this week.  Oxygen level 95% on room air in the office today.  Patient actually looks fairly euvolemic.  No acute distress.  They are checking daily weight and weight has remained stable.  They will call if she gains 3 pounds in 1 day or 5 pounds in a week.  Blood pressure at home has been staying mostly 99 to low 100s systolic, with one reading of 149 systolic, which is an outlier and we are not sure this is accurate.  They will continue to monitor heart rate and blood pressure at home, as above.    Patient to keep 4/23/19 follow-up with Dr. Ellis as scheduled or follow-up sooner if needed for any new, recurrent, or worsening symptoms or other problems/concerns.        Past Medical History:   Diagnosis Date   • Acute congestive heart failure (CMS/HCC)    • Anxiety    • Atrial fibrillation (CMS/HCC)    • CKD (chronic kidney disease), stage III (CMS/HCC)    • Dyslipidemia    • Elevated brain natriuretic peptide (BNP) level    • Elevated troponin    • Generalized weakness    • Glaucoma    • Hypertension    • Localized swelling of both lower legs    • Meningioma (CMS/HCC)    • Near syncope    • Neuropathy    • Paresthesia    • Short of breath on exertion    • Skin cancer    • TIA  (transient ischemic attack)    • Weakness of lower extremity    • Weakness of right upper extremity      Past Surgical History:   Procedure Laterality Date   • APPENDECTOMY     • EYE SURGERY     • KNEE SURGERY Left     torn miniscus repair   • OOPHORECTOMY       Outpatient Medications Prior to Visit   Medication Sig Dispense Refill   • BELSOMRA 10 MG tablet TAKE 1 TABLET BY MOUTH EVERY EVENING 30 tablet 3   • docusate sodium (COLACE) 100 MG capsule Take 100 mg by mouth As Needed for Constipation.     • dorzolamide-timolol (COSOPT) 22.3-6.8 MG/ML ophthalmic solution Administer 1 drop to both eyes 2 (Two) Times a Day.  0   • furosemide (LASIX) 20 MG tablet TAKE 1 TABLET BY MOUTH TWICE DAILY OR AS DIRECTED 180 tablet 1   • latanoprost (XALATAN) 0.005 % ophthalmic solution Administer 1 drop to both eyes Daily.  0   • melatonin 5 MG tablet tablet Take 5 mg by mouth Every Night.     • mirtazapine (REMERON) 15 MG tablet TAKE 1 TABLET BY MOUTH EVERY NIGHT AT BEDTIME 90 tablet 0   • pilocarpine (PILOCAR) 1 % ophthalmic solution Administer  to both eyes 2 (Two) Times a Day.     • carvedilol (COREG) 6.25 MG tablet Take 0.5 tablets by mouth 2 (Two) Times a Day With Meals. (Patient taking differently: Take 6.25 mg by mouth 2 (Two) Times a Day With Meals.) 180 tablet 3   • carvedilol (COREG) 6.25 MG tablet Take 0.5 tablets by mouth 2 (Two) Times a Day With Meals. 90 tablet 1   • chlorothiazide (DIURIL) 250 MG tablet TAKE 1 TABLET BY MOUTH EVERY MORNING 90 tablet 1   • Suvorexant (BELSOMRA PO) Take  by mouth Every Night.       No facility-administered medications prior to visit.        Allergies as of 03/26/2019 - Reviewed 03/26/2019   Allergen Reaction Noted   • Penicillins Swelling 10/20/2014     Social History     Socioeconomic History   • Marital status:      Spouse name: Not on file   • Number of children: Not on file   • Years of education: Not on file   • Highest education level: Not on file   Tobacco Use   • Smoking  "status: Never Smoker   • Smokeless tobacco: Never Used   Substance and Sexual Activity   • Alcohol use: Yes     Alcohol/week: 0.6 oz     Types: 1 Glasses of wine per week     Comment: per week/  Daily caffeine use   • Drug use: No   • Sexual activity: No     Family History   Problem Relation Age of Onset   • Angina Mother    • Cancer Father    • No Known Problems Maternal Grandmother    • No Known Problems Maternal Grandfather    • No Known Problems Paternal Grandmother    • No Known Problems Paternal Grandfather        Review of Systems   Constitution: Positive for decreased appetite and malaise/fatigue. Negative for chills, fever, weight gain and weight loss.   HENT: Negative for ear pain, hearing loss, nosebleeds and sore throat.    Eyes: Negative for blurred vision, double vision, redness, vision loss in left eye, vision loss in right eye and visual disturbance.   Cardiovascular: Positive for dyspnea on exertion and leg swelling.   Respiratory: Positive for shortness of breath. Negative for cough, snoring and wheezing.    Endocrine: Negative for cold intolerance and heat intolerance.   Skin: Negative for itching, rash and suspicious lesions.   Musculoskeletal: Negative for joint pain, joint swelling and myalgias.   Gastrointestinal: Negative for abdominal pain, diarrhea, hematemesis, melena, nausea and vomiting.   Genitourinary: Negative for dysuria, frequency and hematuria.   Neurological: Positive for dizziness. Negative for headaches, numbness, paresthesias and seizures.   Psychiatric/Behavioral: Negative for altered mental status and depression. The patient is not nervous/anxious.           Objective:     Vitals:    03/26/19 1530   BP: 100/70   BP Location: Left arm   Cuff Size: Pediatric   Pulse: (!) 42   Weight: 59.3 kg (130 lb 12.8 oz)   Height: 162.6 cm (64\")     Body mass index is 22.45 kg/m².      PHYSICAL EXAM:  Physical Exam   Constitutional: She is oriented to person, place, and time. She appears " well-developed and well-nourished. No distress.   HENT:   Head: Normocephalic and atraumatic.   Eyes: Pupils are equal, round, and reactive to light.   Neck: Neck supple. No JVD present. Carotid bruit is not present. No tracheal deviation present.   Cardiovascular: Normal rate, normal heart sounds and intact distal pulses. An irregularly irregular rhythm present. Exam reveals no gallop and no friction rub.   No murmur heard.  Pulses:       Radial pulses are 2+ on the right side, and 2+ on the left side.        Posterior tibial pulses are 2+ on the right side, and 2+ on the left side.   Pulmonary/Chest: Effort normal and breath sounds normal. No respiratory distress. She has no wheezes.   Abdominal: Soft. Bowel sounds are normal. She exhibits no distension. There is no tenderness.   Musculoskeletal: She exhibits edema (trace edema to bilateral ankles). She exhibits no tenderness or deformity.   Neurological: She is alert and oriented to person, place, and time.   Skin: Skin is warm and dry. No rash noted. She is not diaphoretic. No erythema.   Psychiatric: She has a normal mood and affect. Her behavior is normal. Judgment normal.           ECG 12 Lead  Date/Time: 3/26/2019 5:26 PM  Performed by: Zainab Perez DNP, APRN  Authorized by: Zainab Perez DNP, [facility]NG   Comparison: compared with previous ECG from 11/28/2018  Similar to previous ECG  Comparison to previous ECG: Heart rate decreased from previous ECG.  Rhythm: atrial fibrillation  Rate: bradycardic  BPM: 42  Conduction: right bundle branch block  Other findings: non-specific ST-T wave changes  Other findings comments: Similar to previous ECG.    Clinical impression: abnormal EKG            Assessment:       Diagnosis Plan   1. Persistent atrial fibrillation (CMS/HCC)     2. Essential hypertension     3. Renal insufficiency     4. Pleural effusion due to CHF (congestive heart failure) (CMS/HCC)     5. Chronic diastolic congestive heart failure (CMS/HCC)            Plan:     1. Persistent atrial fibrillation: Patient remains in atrial fibrillation on ECG today.  Heart rate 42 bpm.  Blood pressure low normal at 100/70.  At this point we will cautiously stop carvedilol.  They will call if systolic blood pressure readings elevate or if heart rate gets above 89 bpm.  2. Hypertension: Blood pressure low normal in the office today.  Plan as above.  3. Chronic congestive heart failure: Patient actually appears euvolemic in the office today.  Only trace edema to ankles.  We will recheck a proBNP and BMP through home health this week.  4. Renal insufficiency: Rechecking labs this week for home health, as above.  Lab orders given.  5. History of pleural effusion: They see primary care provider next week and will discuss repeat chest x-ray.  She reports shortness of breath is much improved since hospitalization.  If chest x-ray is not done through primary care they will call us and we will order it.  6. Abdominal mass: Given age and frailty will be up to patient and family whether to pursue further evaluation/treatment.  Follow-up with primary care next week as scheduled.    Patient to keep 4/23/19 follow-up with Dr. Ellis as scheduled or follow-up sooner if needed for any new, recurrent, or worsening symptoms or other problems/concerns.    Greater than 13 minutes of 25-minute office visit was spent with patient counseling on diet, salt restrictions, monitoring daily weights, monitoring heart rate and blood pressure, when to call the office if weight elevates or blood pressure is high or low, balance physical therapy, medication adjustments, follow-up labs, follow-up testing, as well as follow-up visits with our office in outside offices, as well as signs/symptoms that warrant immediate call to our office for sooner visit or ER visit.           Your medication list           Accurate as of 3/26/19  5:27 PM. If you have any questions, ask your nurse or doctor.                CONTINUE taking these medications      Instructions Last Dose Given Next Dose Due   BELSOMRA 10 MG tablet  Generic drug:  Suvorexant      TAKE 1 TABLET BY MOUTH EVERY EVENING       docusate sodium 100 MG capsule  Commonly known as:  COLACE      Take 100 mg by mouth As Needed for Constipation.       dorzolamide-timolol 22.3-6.8 MG/ML ophthalmic solution  Commonly known as:  COSOPT      Administer 1 drop to both eyes 2 (Two) Times a Day.       furosemide 20 MG tablet  Commonly known as:  LASIX      TAKE 1 TABLET BY MOUTH TWICE DAILY OR AS DIRECTED       latanoprost 0.005 % ophthalmic solution  Commonly known as:  XALATAN      Administer 1 drop to both eyes Daily.       melatonin 5 MG tablet tablet      Take 5 mg by mouth Every Night.       mirtazapine 15 MG tablet  Commonly known as:  REMERON      TAKE 1 TABLET BY MOUTH EVERY NIGHT AT BEDTIME       pilocarpine 1 % ophthalmic solution  Commonly known as:  PILOCAR      Administer  to both eyes 2 (Two) Times a Day.          STOP taking these medications    carvedilol 6.25 MG tablet  Commonly known as:  COREG  Stopped by:  Zainab Perez DNP, APRN        chlorothiazide 250 MG tablet  Commonly known as:  DIURIL  Stopped by:  Zainab Perez DNP, APRN               I stop patient's carvedilol but did not stop the chlorothiazide, this was stopped during hospitalization.  Patient's medication list was updated to reflect medications they are currently taking including medication changes made by other providers.        Thanks,    Zainab Perez DNP, APRN  03/26/2019             Dictated utilizing Dragon dictation

## 2019-04-03 ENCOUNTER — TELEPHONE (OUTPATIENT)
Dept: FAMILY MEDICINE CLINIC | Facility: CLINIC | Age: 84
End: 2019-04-03

## 2019-04-03 NOTE — TELEPHONE ENCOUNTER
Left message letting patient's daughter know what saturnino said also called aye with home health and let her know as well

## 2019-04-03 NOTE — TELEPHONE ENCOUNTER
Darius pt -  Call received from Maritza (home health nurse)  Pt was recently discharged from the hospital and hasn't had a bowel movement since Fri. They have tried prune juice and stool softeners, her daughter is curious if you have any other suggestions for pt?      Also, pt has had a dry cough for a few days now, it is keeping her up at night. Her daughter wanted to give her Mucinex DM. Pt is also prescribed Belsomra. She is curious if this would be ok to give her?

## 2019-04-09 ENCOUNTER — OFFICE VISIT (OUTPATIENT)
Dept: FAMILY MEDICINE CLINIC | Facility: CLINIC | Age: 84
End: 2019-04-09

## 2019-04-09 VITALS
WEIGHT: 130 LBS | OXYGEN SATURATION: 95 % | TEMPERATURE: 98.3 F | BODY MASS INDEX: 22.2 KG/M2 | SYSTOLIC BLOOD PRESSURE: 130 MMHG | DIASTOLIC BLOOD PRESSURE: 70 MMHG | RESPIRATION RATE: 14 BRPM | HEIGHT: 64 IN | HEART RATE: 65 BPM

## 2019-04-09 DIAGNOSIS — F51.01 PRIMARY INSOMNIA: ICD-10-CM

## 2019-04-09 DIAGNOSIS — R53.1 GENERALIZED WEAKNESS: ICD-10-CM
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
## 2019-04-09 DIAGNOSIS — I48.19 PERSISTENT ATRIAL FIBRILLATION (HCC): ICD-10-CM

## 2019-04-09 DIAGNOSIS — I48.19 PERSISTENT ATRIAL FIBRILLATION (HCC): Primary | ICD-10-CM

## 2019-04-09 DIAGNOSIS — I50.9 PLEURAL EFFUSION DUE TO CHF (CONGESTIVE HEART FAILURE) (HCC): ICD-10-CM

## 2019-04-09 PROBLEM — K56.41 FECAL IMPACTION (HCC): Status: ACTIVE | Noted: 2019-03-21

## 2019-04-09 PROCEDURE — 99495 TRANSJ CARE MGMT MOD F2F 14D: CPT | Performed by: FAMILY MEDICINE

## 2019-04-09 PROCEDURE — 71046 X-RAY EXAM CHEST 2 VIEWS: CPT | Performed by: FAMILY MEDICINE

## 2019-04-09 PROCEDURE — 93000 ELECTROCARDIOGRAM COMPLETE: CPT | Performed by: FAMILY MEDICINE

## 2019-04-09 RX ORDER — ATORVASTATIN CALCIUM 20 MG/1
20 TABLET, FILM COATED ORAL NIGHTLY
COMMUNITY
Start: 2019-03-23 | End: 2019-04-09

## 2019-04-09 NOTE — PROGRESS NOTES
Transitional Care Follow Up Visit  Subjective     Christiano Bryant is a 89 y.o. female who presents for a transitional care management visit.    Within 48 business hours after discharge our office contacted her via telephone to coordinate her care and needs.      I reviewed and discussed the details of that call along with the discharge summary, hospital problems, inpatient lab results, inpatient diagnostic studies, and consultation reports with Christiano.     Current outpatient and discharge medications have been reconciled for the patient.    No flowsheet data found.  Risk for Readmission (LACE) No Data Recorded    History of Present Illness   Course During Hospital Stay:     89 year old female with PMHx of CHF. HTN, HLD, afib, meningioma presented to the ED for fatigue, generalized weakness and SOA. The patient's family reports since November the patient has had progressively worsening fatigue and generalized weakness. The family reports for the past 2 weeks the patient has been sleeping all day. Patient also has a 15 pound weight loss since November. The patient herself notes orthopnea and increased swelling in both of her legs. CT ab/pelvis without contrast noted mass measuring 4.3x3.7x5.0cm.  She was brought in for increasing shortness of breath or bilateral pleural effusions. Her hospital course is summarized below.    Bilateral pleural effusion, R>L, likely acute DHF   - Thoracentesis performed on the right and had 1 liter removed. Her breathing improved after this she reports she is back to her baseline.     Right pelvic mass  - I discussed with oncology and the family today at the bedside. At this time the patient does not have any acute issues related to this mass. She did not have any pain in her thoracentesis is likely related to heart failure rather than this. However we did discuss the possibility of other interventions including surgery. Oncology recommended that the patient not have any aggressive treatment  at this time. He does not feel she would be a good surgical candidate or chemotherapy candidate. I discussed with the patient and recommended that she discuss this with her primary care physician and then with her family further to see whether she wants to pursue a more aggressive approach.     Constipation/ fecal impaction   - She with a bowel regimen and resolved     Elevated troponin  - suspect demand ischemia, no peak in trop      TCP  - Oncology/hematology evaluated and felt that this was more slowly progressive problems of pain over some time.   She does not have any active bleeding.    A fib  - she reports she was not on eliquis at home has only been on an ASA by per pcp due to concern of fall risk.     I recommend that she follow-up with her PCP in the next 1 week and have a discussion regarding her mass and monitoring her volume status.    She still has cytology reports pending on her recent thoracentesis.    Patient at this time continues to have fatigue and chronic cough.  Review of records from her recent cardiology visit show that she was to have an EKG as well as a chest x-ray.     The following portions of the patient's history were reviewed and updated as appropriate: allergies, current medications, past family history, past medical history, past social history, past surgical history and problem list.    Review of Systems   Constitutional: Positive for fatigue.   HENT: Positive for congestion.    Respiratory: Positive for cough.    All other systems reviewed and are negative.      Objective     Physical Exam   Constitutional: She appears cachectic.   Cardiovascular: An irregularly irregular rhythm present.   Pulmonary/Chest: Effort normal. No respiratory distress. She has no wheezes. She has rales.   Abdominal: There is no tenderness.       ECG 12 Lead  Date/Time: 4/9/2019 2:55 PM  Performed by: Randall Mccoy MD  Authorized by: Randall Mccoy MD   Comparison: compared with previous ECG   Similar  to previous ECG  Rhythm: atrial fibrillation  Rate: normal  Conduction: conduction normal  ST Segments: ST segments normal  T Waves: T waves normal  QRS axis: right  Other: no other findings    Clinical impression: abnormal EKG            Assessment/Plan   Christiano was seen today for pleural effusion, insomnia and cough.    Diagnoses and all orders for this visit:    Persistent atrial fibrillation (CMS/HCC)  -     ECG 12 Lead    Pleural effusion due to CHF (congestive heart failure) (CMS/HCC)  -     ECG 12 Lead    Primary insomnia  -     ECG 12 Lead    Generalized weakness  -     ECG 12 Lead    Extensive conversation and chart review had with the patient.  I am still quite concerned about her.    2 view chest x-ray was done in the office.  Unable to compare this with previous x-rays but there does not appear to be any acute process.  No fluid noted in the lungs.    Advised patient to contact cardiologist office today to see about moving up her follow-up appointment.  We will see the patient back in 1 week.  Discussed reasons for an ER visit.

## 2019-04-24 RX ORDER — SUVOREXANT 10 MG/1
TABLET, FILM COATED ORAL
Qty: 30 TABLET | Refills: 0 | Status: SHIPPED | OUTPATIENT
Start: 2019-04-24 | End: 2019-12-21

## 2019-04-26 ENCOUNTER — OFFICE VISIT (OUTPATIENT)
Dept: CARDIOLOGY | Facility: CLINIC | Age: 84
End: 2019-04-26

## 2019-04-26 ENCOUNTER — HOSPITAL ENCOUNTER (OUTPATIENT)
Dept: CARDIOLOGY | Facility: HOSPITAL | Age: 84
Discharge: HOME | End: 2019-04-26
Admitting: INTERNAL MEDICINE

## 2019-04-26 VITALS
DIASTOLIC BLOOD PRESSURE: 60 MMHG | BODY MASS INDEX: 21.51 KG/M2 | WEIGHT: 126 LBS | HEART RATE: 61 BPM | HEIGHT: 64 IN | SYSTOLIC BLOOD PRESSURE: 120 MMHG

## 2019-04-26 DIAGNOSIS — N28.9 RENAL INSUFFICIENCY: Primary | ICD-10-CM

## 2019-04-26 DIAGNOSIS — N28.9 RENAL INSUFFICIENCY: ICD-10-CM

## 2019-04-26 DIAGNOSIS — I48.91 NEW ONSET ATRIAL FIBRILLATION (HCC): ICD-10-CM

## 2019-04-26 DIAGNOSIS — I50.32 CHRONIC DIASTOLIC CONGESTIVE HEART FAILURE (HCC): ICD-10-CM

## 2019-04-26 DIAGNOSIS — I10 ESSENTIAL HYPERTENSION: ICD-10-CM

## 2019-04-26 DIAGNOSIS — R60.0 LOCALIZED EDEMA: ICD-10-CM

## 2019-04-26 DIAGNOSIS — I50.22 CHRONIC SYSTOLIC CONGESTIVE HEART FAILURE (HCC): ICD-10-CM

## 2019-04-26 DIAGNOSIS — R06.09 DYSPNEA ON EXERTION: ICD-10-CM

## 2019-04-26 DIAGNOSIS — N18.30 STAGE III CHRONIC KIDNEY DISEASE (HCC): ICD-10-CM

## 2019-04-26 LAB
ANION GAP SERPL CALCULATED.3IONS-SCNC: 11.5 MMOL/L
BUN BLD-MCNC: 27 MG/DL (ref 8–23)
BUN/CREAT SERPL: 25 (ref 7–25)
CALCIUM SPEC-SCNC: 10.7 MG/DL (ref 8.2–9.6)
CHLORIDE SERPL-SCNC: 97 MMOL/L (ref 98–107)
CO2 SERPL-SCNC: 31.5 MMOL/L (ref 22–29)
CREAT BLD-MCNC: 1.08 MG/DL (ref 0.57–1)
GFR SERPL CREATININE-BSD FRML MDRD: 47 ML/MIN/1.73
GLUCOSE BLD-MCNC: 102 MG/DL (ref 65–99)
MAGNESIUM SERPL-MCNC: 2.3 MG/DL (ref 1.7–2.3)
POTASSIUM BLD-SCNC: 4 MMOL/L (ref 3.5–5.2)
SODIUM BLD-SCNC: 140 MMOL/L (ref 136–145)

## 2019-04-26 PROCEDURE — 99214 OFFICE O/P EST MOD 30 MIN: CPT | Performed by: INTERNAL MEDICINE

## 2019-04-26 PROCEDURE — 83880 ASSAY OF NATRIURETIC PEPTIDE: CPT | Performed by: INTERNAL MEDICINE

## 2019-04-26 PROCEDURE — 83735 ASSAY OF MAGNESIUM: CPT | Performed by: INTERNAL MEDICINE

## 2019-04-26 PROCEDURE — 93000 ELECTROCARDIOGRAM COMPLETE: CPT | Performed by: INTERNAL MEDICINE

## 2019-04-26 PROCEDURE — 80048 BASIC METABOLIC PNL TOTAL CA: CPT | Performed by: INTERNAL MEDICINE

## 2019-04-26 PROCEDURE — 36415 COLL VENOUS BLD VENIPUNCTURE: CPT

## 2019-04-26 NOTE — PROGRESS NOTES
Date of Office Visit: 2019  Encounter Provider: Frida Ellis MD  Place of Service: [facility] CARDIOLOGY  Patient Name: Christiano Bryant  :3/7/1923    Chief complaint      History of Present Illness  89-year-old female with history of renal insufficiency, neuropathy, meningioma, persistent atrial fibrillation, TIA.  In  she had a TIA in the setting of new onset atrial fibrillation.  Echocardiogram showed normal systolic function without significant valvular heart disease.  She was started on Eliquis.  It was subsequently switched to Coumadin.  2018 she was seen for congestive heart failure diuretics were increased though care complicated by renal insufficiency.  In 2019 she was admitted to [facility] with generalized weakness, weight loss.  CT scan of the abdomen showed pelvic mass and she was noted to have bilateral pleural effusions for which she underwent thoracentesis.  She was not felt to be a candidate for further surgical intervention or chemotherapy.  They had opted to not pursue further aggressive assessment.  The effusion was felt to be transudative.  An echocardiogram showed severe left ventricular hypertrophy with an ejection fraction of 69% dilated right ventricle with right ventricular systolic dysfunction and mitral annular calcification without stenosis or regurgitation.  She saw my nurse practitioner and 2019 following that admission.  At that time she was feeling relatively well with persistent atrial fibrillation rates were slightly low and carvedilol was discontinued.  Blood pressure was also low at the time.  She appeared euvolemic.    Since last visit she has lost 4 pounds.  She denies any chest pain palpitations.  She has mild residual dyspnea on exertion.  She has had dependent edema that is mild.  She has fallen on 3 separate occasions 1 falling backwards the other 2 at home when she tripped and fell.  In 2019 due to  falls warfarin was discontinued.    Past Medical History:   Diagnosis Date   • Acute congestive heart failure (CMS/HCC)    • Anxiety    • Atrial fibrillation (CMS/HCC)    • Chronic diastolic (congestive) heart failure (CMS/HCC)    • CKD (chronic kidney disease), stage III (CMS/HCC)    • Dyslipidemia    • Elevated brain natriuretic peptide (BNP) level    • Elevated troponin    • Generalized weakness    • Glaucoma    • Hypertension    • Localized swelling of both lower legs    • Meningioma (CMS/HCC)    • Near syncope    • Neuropathy    • Paresthesia    • Pleural effusion due to CHF (congestive heart failure) (CMS/HCC)    • Renal insufficiency    • Short of breath on exertion    • Skin cancer    • TIA (transient ischemic attack)    • Weakness of lower extremity    • Weakness of right upper extremity      Past Surgical History:   Procedure Laterality Date   • APPENDECTOMY     • EYE SURGERY     • KNEE SURGERY Left     torn miniscus repair   • OOPHORECTOMY       Outpatient Medications Prior to Visit   Medication Sig Dispense Refill   • BELSOMRA 10 MG tablet TAKE 1 TABLET BY MOUTH EVERY EVENING 30 tablet 0   • docusate sodium (COLACE) 100 MG capsule Take 100 mg by mouth As Needed for Constipation.     • dorzolamide-timolol (COSOPT) 22.3-6.8 MG/ML ophthalmic solution Administer 1 drop to both eyes 2 (Two) Times a Day.  0   • furosemide (LASIX) 20 MG tablet TAKE 1 TABLET BY MOUTH TWICE DAILY OR AS DIRECTED 180 tablet 1   • latanoprost (XALATAN) 0.005 % ophthalmic solution Administer 1 drop to both eyes Daily.  0   • melatonin 5 MG tablet tablet Take 5 mg by mouth Every Night.     • pilocarpine (PILOCAR) 1 % ophthalmic solution Administer  to both eyes 2 (Two) Times a Day.     • mirtazapine (REMERON) 15 MG tablet TAKE 1 TABLET BY MOUTH EVERY NIGHT AT BEDTIME 90 tablet 0     No facility-administered medications prior to visit.        Allergies as of 04/26/2019 - Reviewed 04/26/2019   Allergen Reaction Noted   • Penicillins  "Swelling 10/20/2014     Social History     Socioeconomic History   • Marital status:      Spouse name: Not on file   • Number of children: Not on file   • Years of education: Not on file   • Highest education level: Not on file   Tobacco Use   • Smoking status: Never Smoker   • Smokeless tobacco: Never Used   Substance and Sexual Activity   • Alcohol use: Yes     Alcohol/week: 0.6 oz     Types: 1 Glasses of wine per week     Comment: per week/  Daily caffeine use   • Drug use: No   • Sexual activity: No     Family History   Problem Relation Age of Onset   • Angina Mother    • Cancer Father    • No Known Problems Maternal Grandmother    • No Known Problems Maternal Grandfather    • No Known Problems Paternal Grandmother    • No Known Problems Paternal Grandfather      Review of Systems   Constitution: Positive for malaise/fatigue and weight loss. Negative for fever and weight gain.   HENT: Negative for ear pain, hearing loss, nosebleeds and sore throat.    Eyes: Negative for double vision, pain, vision loss in left eye and vision loss in right eye.   Cardiovascular:        See history of present illness.   Respiratory: Negative for cough, shortness of breath, sleep disturbances due to breathing, snoring and wheezing.    Endocrine: Negative for cold intolerance, heat intolerance and polyuria.   Skin: Negative for itching, poor wound healing and rash.   Musculoskeletal: Negative for joint pain, joint swelling and myalgias.   Gastrointestinal: Positive for diarrhea. Negative for abdominal pain, hematochezia, nausea and vomiting.   Genitourinary: Negative for hematuria and hesitancy.   Neurological: Negative for numbness, paresthesias and seizures.   Psychiatric/Behavioral: Negative for depression. The patient is not nervous/anxious.         Objective:     Vitals:    04/26/19 0957   BP: 120/60   BP Location: Left arm   Pulse: 61   Weight: 57.2 kg (126 lb)   Height: 162.6 cm (64\")     Body mass index is 21.63 " kg/m².    Physical Exam   Constitutional: She is oriented to person, place, and time. She appears well-developed and well-nourished.   HENT:   Head: Normocephalic.   Nose: Nose normal.   Mouth/Throat: Oropharynx is clear and moist.   Eyes: Conjunctivae and EOM are normal. Pupils are equal, round, and reactive to light. Right eye exhibits no discharge. No scleral icterus.   Neck: Normal range of motion. Neck supple. No JVD present. No thyromegaly present.   Cardiovascular: Normal rate, regular rhythm, normal heart sounds and intact distal pulses. Exam reveals no gallop and no friction rub.   No murmur heard.  Pulses:       Carotid pulses are 2+ on the right side, and 2+ on the left side.       Radial pulses are 2+ on the right side, and 2+ on the left side.        Femoral pulses are 2+ on the right side, and 2+ on the left side.       Popliteal pulses are 2+ on the right side, and 2+ on the left side.        Dorsalis pedis pulses are 2+ on the right side, and 2+ on the left side.        Posterior tibial pulses are 2+ on the right side, and 2+ on the left side.   Pulmonary/Chest: Effort normal and breath sounds normal. No respiratory distress. She has no wheezes. She has no rales.   Abdominal: Soft. Bowel sounds are normal. She exhibits no distension. There is no hepatosplenomegaly. There is no tenderness. There is no rebound.   Musculoskeletal: Normal range of motion. She exhibits no edema or tenderness.   Neurological: She is alert and oriented to person, place, and time.   Skin: Skin is warm and dry. No rash noted. No erythema.   Psychiatric: She has a normal mood and affect. Her behavior is normal. Judgment and thought content normal.   Vitals reviewed.    Lab Review:     ECG 12 Lead  Date/Time: 4/26/2019 5:25 PM  Performed by: Frida Ellis MD  Authorized by: Frida Ellis MD   Rhythm: atrial fibrillation  Ectopy: unifocal PVCs  Conduction: right bundle branch block    Clinical impression: abnormal  EKG          Assessment:       Diagnosis Plan   1. Renal insufficiency  Basic Metabolic Panel    Magnesium   2. Localized edema  proBNP   3. Dyspnea on exertion   proBNP   4. New onset atrial fibrillation (CMS/HCC)     5. Chronic diastolic congestive heart failure (CMS/HCC)     6. Essential hypertension     7. Stage III chronic kidney disease (CMS/HCC)       Plan:       1.  Persistent atrial fibrillation.  Rates are controlled.  He is a poor candidate for anticoagulation.  She will start enteric-coated aspirin 81 mg a day with food  2.  Renal insufficiency followed by Dr. Mccoy  3.  History of TIA  4.  Dyslipidemia  5.  Edema mild residual dyspnea.  Will check a BMP and a BNP.  Depending on these labs may need to increase Lasix further and also adjust potassium supplements  6.  History of diastolic heart failure.  As above    Atrial Fibrillation and Atrial Flutter  Assessment  • The patient has persistent atrial fibrillation  • This is non-valvular in etiology  • The patient's CHADS2-VASc score is 4  • A HST5GE7-BRSg score of 2 or more is considered a high risk for a thromboembolic event  • Warfarin prescribed    Plan  • Continue in atrial fibrillation with rate control  • Continue warfarin for antithrombotic therapy, bleeding issues discussed  • Continue beta blocker for rhythm control         Your medication list           Accurate as of 4/26/19 11:59 PM. If you have any questions, ask your nurse or doctor.               CONTINUE taking these medications      Instructions Last Dose Given Next Dose Due   BELSOMRA 10 MG tablet  Generic drug:  Suvorexant      TAKE 1 TABLET BY MOUTH EVERY EVENING       docusate sodium 100 MG capsule  Commonly known as:  COLACE      Take 100 mg by mouth As Needed for Constipation.       dorzolamide-timolol 22.3-6.8 MG/ML ophthalmic solution  Commonly known as:  COSOPT      Administer 1 drop to both eyes 2 (Two) Times a Day.       furosemide 20 MG tablet  Commonly known as:  LASIX       TAKE 1 TABLET BY MOUTH TWICE DAILY OR AS DIRECTED       latanoprost 0.005 % ophthalmic solution  Commonly known as:  XALATAN      Administer 1 drop to both eyes Daily.       melatonin 5 MG tablet tablet      Take 5 mg by mouth Every Night.       mirtazapine 15 MG tablet  Commonly known as:  REMERON      TAKE 1 TABLET BY MOUTH EVERY NIGHT AT BEDTIME       pilocarpine 1 % ophthalmic solution  Commonly known as:  PILOCAR      Administer  to both eyes 2 (Two) Times a Day.              Dictated utilizing Dragon dictation

## 2019-04-28 PROBLEM — I50.22 CHRONIC SYSTOLIC CONGESTIVE HEART FAILURE (HCC): Status: ACTIVE | Noted: 2019-04-28

## 2019-04-28 PROBLEM — R06.09 DYSPNEA ON EXERTION: Status: ACTIVE | Noted: 2019-04-28

## 2019-04-28 LAB — NT-PROBNP SERPL-MCNC: ABNORMAL PG/ML (ref 5–1800)

## 2019-05-01 ENCOUNTER — TELEPHONE (OUTPATIENT)
Dept: CARDIOLOGY | Facility: CLINIC | Age: 84
End: 2019-05-01

## 2019-05-01 DIAGNOSIS — I10 ESSENTIAL HYPERTENSION: Primary | ICD-10-CM

## 2019-05-01 DIAGNOSIS — N28.9 RENAL INSUFFICIENCY: Primary | ICD-10-CM

## 2019-05-01 DIAGNOSIS — N28.9 RENAL INSUFFICIENCY: ICD-10-CM

## 2019-05-01 RX ORDER — FUROSEMIDE 40 MG/1
40 TABLET ORAL 2 TIMES DAILY
Qty: 60 TABLET | Refills: 1 | Status: SHIPPED | OUTPATIENT
Start: 2019-05-01 | End: 2019-05-01 | Stop reason: SDUPTHER

## 2019-05-01 RX ORDER — POTASSIUM CHLORIDE 750 MG/1
10 TABLET, FILM COATED, EXTENDED RELEASE ORAL DAILY
Qty: 30 TABLET | Refills: 1 | Status: SHIPPED | OUTPATIENT
Start: 2019-05-01 | End: 2019-05-01 | Stop reason: SDUPTHER

## 2019-05-01 NOTE — TELEPHONE ENCOUNTER
BMP order placed. Let's see what thi shows in one week and then decide re neph consult. [email or web address]-[email or web address]

## 2019-05-01 NOTE — TELEPHONE ENCOUNTER
Info to pt's daughter.  Rx sent.  Verbalized understanding.  They have had issues in the past with the kidneys but she has never seen a Nephrologist.

## 2019-05-01 NOTE — TELEPHONE ENCOUNTER
Let daughter know fluid test is I abnormal and to increase Lasix to 40 mg b.i.d. and start potassium chloride 10 meq a day. Check a BMP in one week and see if she has seen nephrologist not will need to have her see one as kidney tests are also abnormal.zita

## 2019-05-01 NOTE — TELEPHONE ENCOUNTER
Pt's daughter (Meghana) @ [phone] called and asking for labs results and does anything need to be increased?  No SOA and some swelling in her ankle but it has been worse.      Current Meds:  Lasix 20mg BID  Not on any K+

## 2019-05-03 RX ORDER — FUROSEMIDE 40 MG/1
TABLET ORAL
Qty: 180 TABLET | Refills: 1 | Status: SHIPPED | OUTPATIENT
Start: 2019-05-03 | End: 2019-08-21

## 2019-05-03 RX ORDER — POTASSIUM CHLORIDE 750 MG/1
10 TABLET, FILM COATED, EXTENDED RELEASE ORAL DAILY
Qty: 90 TABLET | Refills: 1 | Status: SHIPPED | OUTPATIENT
Start: 2019-05-03 | End: 2020-01-06

## 2019-05-08 DIAGNOSIS — F51.01 PRIMARY INSOMNIA: ICD-10-CM

## 2019-05-08 DIAGNOSIS — F41.0 PANIC ATTACKS: ICD-10-CM

## 2019-05-08 RX ORDER — MIRTAZAPINE 15 MG/1
TABLET, FILM COATED ORAL
Qty: 90 TABLET | Refills: 0 | Status: SHIPPED | OUTPATIENT
Start: 2019-05-08 | End: 2019-09-29 | Stop reason: SDUPTHER

## 2019-07-03 ENCOUNTER — APPOINTMENT (OUTPATIENT)
Dept: LAB | Facility: HOSPITAL | Age: 84
End: 2019-07-03

## 2019-07-03 LAB
ANION GAP SERPL CALCULATED.3IONS-SCNC: 9.4 MMOL/L (ref 5–15)
BUN BLD-MCNC: 34 MG/DL (ref 8–23)
BUN/CREAT SERPL: 29.3 (ref 7–25)
CALCIUM SPEC-SCNC: 10.2 MG/DL (ref 8.2–9.6)
CHLORIDE SERPL-SCNC: 101 MMOL/L (ref 98–107)
CO2 SERPL-SCNC: 32.6 MMOL/L (ref 22–29)
CREAT BLD-MCNC: 1.16 MG/DL (ref 0.57–1)
GFR SERPL CREATININE-BSD FRML MDRD: 43 ML/MIN/1.73
GLUCOSE BLD-MCNC: 91 MG/DL (ref 65–99)
POTASSIUM BLD-SCNC: 4.6 MMOL/L (ref 3.5–5.2)
SODIUM BLD-SCNC: 143 MMOL/L (ref 136–145)

## 2019-07-03 PROCEDURE — 80048 BASIC METABOLIC PNL TOTAL CA: CPT | Performed by: INTERNAL MEDICINE

## 2019-07-03 PROCEDURE — 36415 COLL VENOUS BLD VENIPUNCTURE: CPT | Performed by: INTERNAL MEDICINE

## 2019-07-05 ENCOUNTER — TELEPHONE (OUTPATIENT)
Dept: CARDIOLOGY | Facility: CLINIC | Age: 84
End: 2019-07-05

## 2019-07-05 DIAGNOSIS — N28.9 RENAL INSUFFICIENCY: Primary | ICD-10-CM

## 2019-07-05 NOTE — TELEPHONE ENCOUNTER
Please let patient's daughter know that kidney tests are slightly worse and how is edema.?  Also I recommend she see nephrologist as previously recommended and have placed an order.zita

## 2019-07-09 ENCOUNTER — TELEPHONE (OUTPATIENT)
Dept: CARDIOLOGY | Facility: CLINIC | Age: 84
End: 2019-07-09

## 2019-07-09 NOTE — TELEPHONE ENCOUNTER
7/9/19  WORKING ON OVERDUE RESULTS -  You ordered bmp on 3/26/19.  Patient has had 3 bmp's done on 4/26; 5/1 and 7/3.  Also Pro bmp was ordered 3/26 and one was drawn on 4/26 - results in epic.  Is it ok to cancel these two orders since she has had them done? deonna

## 2019-07-10 NOTE — TELEPHONE ENCOUNTER
Unable to reach the patient.  Line rings and goes dead. Will send letter.  Thanks  Elma Pritchard RN  Triage nurse

## 2019-07-16 ENCOUNTER — TELEPHONE (OUTPATIENT)
Dept: CARDIOLOGY | Facility: CLINIC | Age: 84
End: 2019-07-16

## 2019-08-05 ENCOUNTER — TELEPHONE (OUTPATIENT)
Dept: CARDIOLOGY | Facility: CLINIC | Age: 84
End: 2019-08-05

## 2019-08-05 DIAGNOSIS — I50.9 ACUTE CONGESTIVE HEART FAILURE, UNSPECIFIED HEART FAILURE TYPE (HCC): Primary | ICD-10-CM

## 2019-08-05 NOTE — TELEPHONE ENCOUNTER
"08/05/19  8:35 AM  Christiano Bryant  3/7/1923  Home Phone [phone]   Mobile [phone]       Christiano Bryant is a patient of Dr Ellis.  Her Son Lyle is calling in this morning concerned she is putting on fluid, she has a hx of CHF.  He said she is SOA and has edema to BLLE.  He said they quit weighing every day because \"it was stable\"  Explained to him with a hx of CHF, daily wts are the best indicator for fluid wt gain.  She explains she has a \"bad episode, of SOA\" last night.    He doesn't have a bp or HR     Cardiac meds reviewed.  Furosemide 40mg BID (ordered)-he was unable to confirm dose \"he is not with the patient.\"  Potassium 10meq daily    How would you like to proceed?  Elma Pritchard RN  Triage nurse    "

## 2019-08-05 NOTE — TELEPHONE ENCOUNTER
She also has some renal insufficiency.  Please have her come in to see 1 of the APRN's to titrate meds further.  Will need a BMP.zita

## 2019-08-06 NOTE — TELEPHONE ENCOUNTER
I do not know that either of these are necessary.  If I think she needs a chest x-ray I will order it at my appointment.

## 2019-08-06 NOTE — TELEPHONE ENCOUNTER
Marybeth  The son is asking about an xray to check for fluid.  They are stopping by prior to their apt for a BMP, did you want to add a BNP to that?  She is up another couple of lbs today.  Thanks  lEma Pritchard RN  Triage nurse

## 2019-08-06 NOTE — TELEPHONE ENCOUNTER
Scheduled to see Marybeth tomorrow.  I placed order for the lab and they will have it done prior to apt.  Thanks  Elma Pritchard RN  Triage nurse

## 2019-08-07 ENCOUNTER — HOSPITAL ENCOUNTER (INPATIENT)
Facility: HOSPITAL | Age: 84
LOS: 5 days | Discharge: HOME HEALTH | End: 2019-08-12
Attending: INTERNAL MEDICINE | Admitting: INTERNAL MEDICINE

## 2019-08-07 ENCOUNTER — OFFICE VISIT (OUTPATIENT)
Dept: CARDIOLOGY | Facility: CLINIC | Age: 84
End: 2019-08-07

## 2019-08-07 ENCOUNTER — LAB (OUTPATIENT)
Dept: LAB | Facility: HOSPITAL | Age: 84
End: 2019-08-07

## 2019-08-07 ENCOUNTER — APPOINTMENT (OUTPATIENT)
Dept: GENERAL RADIOLOGY | Facility: HOSPITAL | Age: 84
End: 2019-08-07

## 2019-08-07 VITALS
BODY MASS INDEX: 23.66 KG/M2 | DIASTOLIC BLOOD PRESSURE: 64 MMHG | OXYGEN SATURATION: 91 % | SYSTOLIC BLOOD PRESSURE: 122 MMHG | WEIGHT: 138.6 LBS | HEIGHT: 64 IN | HEART RATE: 67 BPM

## 2019-08-07 DIAGNOSIS — I50.43 ACUTE ON CHRONIC COMBINED SYSTOLIC AND DIASTOLIC CONGESTIVE HEART FAILURE (HCC): Primary | ICD-10-CM

## 2019-08-07 DIAGNOSIS — I50.9 ACUTE CONGESTIVE HEART FAILURE, UNSPECIFIED HEART FAILURE TYPE (HCC): ICD-10-CM

## 2019-08-07 DIAGNOSIS — I50.33 ACUTE ON CHRONIC DIASTOLIC HEART FAILURE (HCC): Primary | ICD-10-CM

## 2019-08-07 DIAGNOSIS — N28.9 RENAL INSUFFICIENCY: ICD-10-CM

## 2019-08-07 PROBLEM — I50.31 CHF (CONGESTIVE HEART FAILURE), NYHA CLASS III, ACUTE, DIASTOLIC (HCC): Status: ACTIVE | Noted: 2019-08-07

## 2019-08-07 LAB
ANION GAP SERPL CALCULATED.3IONS-SCNC: 14.4 MMOL/L (ref 5–15)
ANION GAP SERPL CALCULATED.3IONS-SCNC: 8.4 MMOL/L (ref 5–15)
BACTERIA UR QL AUTO: NORMAL /HPF
BASOPHILS # BLD AUTO: 0.03 10*3/MM3 (ref 0–0.2)
BASOPHILS NFR BLD AUTO: 0.6 % (ref 0–1.5)
BILIRUB UR QL STRIP: NEGATIVE
BUN BLD-MCNC: 29 MG/DL (ref 8–23)
BUN BLD-MCNC: 32 MG/DL (ref 8–23)
BUN/CREAT SERPL: 24.8 (ref 7–25)
BUN/CREAT SERPL: 26.7 (ref 7–25)
CALCIUM SPEC-SCNC: 10 MG/DL (ref 8.2–9.6)
CALCIUM SPEC-SCNC: 10.1 MG/DL (ref 8.2–9.6)
CHLORIDE SERPL-SCNC: 100 MMOL/L (ref 98–107)
CHLORIDE SERPL-SCNC: 99 MMOL/L (ref 98–107)
CLARITY UR: CLEAR
CO2 SERPL-SCNC: 26.6 MMOL/L (ref 22–29)
CO2 SERPL-SCNC: 33.6 MMOL/L (ref 22–29)
COLOR UR: YELLOW
CREAT BLD-MCNC: 1.17 MG/DL (ref 0.57–1)
CREAT BLD-MCNC: 1.2 MG/DL (ref 0.57–1)
DEPRECATED RDW RBC AUTO: 51.8 FL (ref 37–54)
EOSINOPHIL # BLD AUTO: 0.11 10*3/MM3 (ref 0–0.4)
EOSINOPHIL NFR BLD AUTO: 2.1 % (ref 0.3–6.2)
ERYTHROCYTE [DISTWIDTH] IN BLOOD BY AUTOMATED COUNT: 14.5 % (ref 12.3–15.4)
GFR SERPL CREATININE-BSD FRML MDRD: 42 ML/MIN/1.73
GFR SERPL CREATININE-BSD FRML MDRD: 43 ML/MIN/1.73
GLUCOSE BLD-MCNC: 81 MG/DL (ref 65–99)
GLUCOSE BLD-MCNC: 84 MG/DL (ref 65–99)
GLUCOSE UR STRIP-MCNC: NEGATIVE MG/DL
HCT VFR BLD AUTO: 41.4 % (ref 34–46.6)
HGB BLD-MCNC: 12.4 G/DL (ref 12–15.9)
HGB UR QL STRIP.AUTO: NEGATIVE
HYALINE CASTS UR QL AUTO: NORMAL /LPF
IMM GRANULOCYTES # BLD AUTO: 0.02 10*3/MM3 (ref 0–0.05)
IMM GRANULOCYTES NFR BLD AUTO: 0.4 % (ref 0–0.5)
KETONES UR QL STRIP: NEGATIVE
LEUKOCYTE ESTERASE UR QL STRIP.AUTO: NEGATIVE
LYMPHOCYTES # BLD AUTO: 0.81 10*3/MM3 (ref 0.7–3.1)
LYMPHOCYTES NFR BLD AUTO: 15.2 % (ref 19.6–45.3)
MCH RBC QN AUTO: 29 PG (ref 26.6–33)
MCHC RBC AUTO-ENTMCNC: 30 G/DL (ref 31.5–35.7)
MCV RBC AUTO: 96.7 FL (ref 79–97)
MONOCYTES # BLD AUTO: 0.43 10*3/MM3 (ref 0.1–0.9)
MONOCYTES NFR BLD AUTO: 8.1 % (ref 5–12)
NEUTROPHILS # BLD AUTO: 3.92 10*3/MM3 (ref 1.7–7)
NEUTROPHILS NFR BLD AUTO: 73.6 % (ref 42.7–76)
NITRITE UR QL STRIP: NEGATIVE
NRBC BLD AUTO-RTO: 0 /100 WBC (ref 0–0.2)
NT-PROBNP SERPL-MCNC: ABNORMAL PG/ML (ref 5–1800)
PH UR STRIP.AUTO: 6.5 [PH] (ref 5–8)
PLATELET # BLD AUTO: 93 10*3/MM3 (ref 140–450)
PMV BLD AUTO: 11.2 FL (ref 6–12)
POTASSIUM BLD-SCNC: 4.3 MMOL/L (ref 3.5–5.2)
POTASSIUM BLD-SCNC: 4.3 MMOL/L (ref 3.5–5.2)
PROT UR QL STRIP: ABNORMAL
RBC # BLD AUTO: 4.28 10*6/MM3 (ref 3.77–5.28)
RBC # UR: NORMAL /HPF
REF LAB TEST METHOD: NORMAL
SODIUM BLD-SCNC: 141 MMOL/L (ref 136–145)
SODIUM BLD-SCNC: 141 MMOL/L (ref 136–145)
SP GR UR STRIP: 1.01 (ref 1–1.03)
SQUAMOUS #/AREA URNS HPF: NORMAL /HPF
UROBILINOGEN UR QL STRIP: ABNORMAL
WBC NRBC COR # BLD: 5.32 10*3/MM3 (ref 3.4–10.8)
WBC UR QL AUTO: NORMAL /HPF

## 2019-08-07 PROCEDURE — 99222 1ST HOSP IP/OBS MODERATE 55: CPT | Performed by: NURSE PRACTITIONER

## 2019-08-07 PROCEDURE — 85025 COMPLETE CBC W/AUTO DIFF WBC: CPT | Performed by: NURSE PRACTITIONER

## 2019-08-07 PROCEDURE — 71046 X-RAY EXAM CHEST 2 VIEWS: CPT

## 2019-08-07 PROCEDURE — 80048 BASIC METABOLIC PNL TOTAL CA: CPT | Performed by: NURSE PRACTITIONER

## 2019-08-07 PROCEDURE — 80048 BASIC METABOLIC PNL TOTAL CA: CPT

## 2019-08-07 PROCEDURE — 81001 URINALYSIS AUTO W/SCOPE: CPT | Performed by: NURSE PRACTITIONER

## 2019-08-07 PROCEDURE — 36415 COLL VENOUS BLD VENIPUNCTURE: CPT

## 2019-08-07 PROCEDURE — 83880 ASSAY OF NATRIURETIC PEPTIDE: CPT | Performed by: NURSE PRACTITIONER

## 2019-08-07 PROCEDURE — 25010000002 FUROSEMIDE PER 20 MG: Performed by: NURSE PRACTITIONER

## 2019-08-07 RX ORDER — SENNOSIDES 8.6 MG
1 TABLET ORAL NIGHTLY PRN
Status: DISCONTINUED | OUTPATIENT
Start: 2019-08-07 | End: 2019-08-12 | Stop reason: HOSPADM

## 2019-08-07 RX ORDER — MIRTAZAPINE 15 MG/1
15 TABLET, FILM COATED ORAL NIGHTLY
Status: DISCONTINUED | OUTPATIENT
Start: 2019-08-07 | End: 2019-08-12 | Stop reason: HOSPADM

## 2019-08-07 RX ORDER — DORZOLAMIDE HYDROCHLORIDE AND TIMOLOL MALEATE 20; 5 MG/ML; MG/ML
1 SOLUTION/ DROPS OPHTHALMIC 2 TIMES DAILY
Status: DISCONTINUED | OUTPATIENT
Start: 2019-08-07 | End: 2019-08-07

## 2019-08-07 RX ORDER — TIMOLOL MALEATE 5 MG/ML
1 SOLUTION/ DROPS OPHTHALMIC EVERY 12 HOURS SCHEDULED
Status: DISCONTINUED | OUTPATIENT
Start: 2019-08-07 | End: 2019-08-12

## 2019-08-07 RX ORDER — FUROSEMIDE 10 MG/ML
40 INJECTION INTRAMUSCULAR; INTRAVENOUS EVERY 12 HOURS
Status: DISCONTINUED | OUTPATIENT
Start: 2019-08-07 | End: 2019-08-09

## 2019-08-07 RX ORDER — PILOCARPINE HYDROCHLORIDE 10 MG/ML
1 SOLUTION/ DROPS OPHTHALMIC 2 TIMES DAILY
Status: DISCONTINUED | OUTPATIENT
Start: 2019-08-07 | End: 2019-08-12

## 2019-08-07 RX ORDER — SODIUM CHLORIDE 0.9 % (FLUSH) 0.9 %
3 SYRINGE (ML) INJECTION EVERY 12 HOURS SCHEDULED
Status: DISCONTINUED | OUTPATIENT
Start: 2019-08-07 | End: 2019-08-12 | Stop reason: HOSPADM

## 2019-08-07 RX ORDER — LATANOPROST 50 UG/ML
1 SOLUTION/ DROPS OPHTHALMIC DAILY
Status: DISCONTINUED | OUTPATIENT
Start: 2019-08-07 | End: 2019-08-12 | Stop reason: HOSPADM

## 2019-08-07 RX ORDER — CHOLECALCIFEROL (VITAMIN D3) 125 MCG
5 CAPSULE ORAL NIGHTLY
Status: DISCONTINUED | OUTPATIENT
Start: 2019-08-07 | End: 2019-08-12 | Stop reason: HOSPADM

## 2019-08-07 RX ORDER — ASPIRIN 81 MG/1
81 TABLET, CHEWABLE ORAL DAILY
COMMUNITY

## 2019-08-07 RX ORDER — POTASSIUM CHLORIDE 750 MG/1
20 CAPSULE, EXTENDED RELEASE ORAL DAILY
Status: DISCONTINUED | OUTPATIENT
Start: 2019-08-07 | End: 2019-08-12 | Stop reason: HOSPADM

## 2019-08-07 RX ORDER — SODIUM CHLORIDE 0.9 % (FLUSH) 0.9 %
3-10 SYRINGE (ML) INJECTION AS NEEDED
Status: DISCONTINUED | OUTPATIENT
Start: 2019-08-07 | End: 2019-08-12 | Stop reason: HOSPADM

## 2019-08-07 RX ORDER — SUVOREXANT 10 MG/1
TABLET, FILM COATED ORAL
Qty: 30 TABLET | Refills: 0 | OUTPATIENT
Start: 2019-08-07

## 2019-08-07 RX ORDER — ASPIRIN 81 MG/1
81 TABLET, CHEWABLE ORAL DAILY
Status: DISCONTINUED | OUTPATIENT
Start: 2019-08-07 | End: 2019-08-12 | Stop reason: HOSPADM

## 2019-08-07 RX ORDER — DORZOLAMIDE HCL 20 MG/ML
1 SOLUTION/ DROPS OPHTHALMIC EVERY 12 HOURS
Status: DISCONTINUED | OUTPATIENT
Start: 2019-08-07 | End: 2019-08-12

## 2019-08-07 RX ORDER — SENNOSIDES 8.6 MG
1 TABLET ORAL NIGHTLY PRN
COMMUNITY
End: 2020-09-22

## 2019-08-07 RX ORDER — GUAIFENESIN 600 MG/1
600 TABLET, EXTENDED RELEASE ORAL 2 TIMES DAILY PRN
COMMUNITY

## 2019-08-07 RX ORDER — DOCUSATE SODIUM 100 MG/1
100 CAPSULE, LIQUID FILLED ORAL AS NEEDED
Status: DISCONTINUED | OUTPATIENT
Start: 2019-08-07 | End: 2019-08-12 | Stop reason: HOSPADM

## 2019-08-07 RX ADMIN — POTASSIUM CHLORIDE 20 MEQ: 750 CAPSULE, EXTENDED RELEASE ORAL at 18:07

## 2019-08-07 RX ADMIN — POLYETHYLENE GLYCOL 3350 17 G: 17 POWDER, FOR SOLUTION ORAL at 18:07

## 2019-08-07 RX ADMIN — MIRTAZAPINE 15 MG: 15 TABLET, FILM COATED ORAL at 23:18

## 2019-08-07 RX ADMIN — SODIUM CHLORIDE, PRESERVATIVE FREE 3 ML: 5 INJECTION INTRAVENOUS at 23:22

## 2019-08-07 RX ADMIN — Medication 5 MG: at 23:19

## 2019-08-07 RX ADMIN — FUROSEMIDE 40 MG: 10 INJECTION, SOLUTION INTRAMUSCULAR; INTRAVENOUS at 20:40

## 2019-08-07 NOTE — PLAN OF CARE
Problem: Patient Care Overview  Goal: Plan of Care Review  Outcome: Ongoing (interventions implemented as appropriate)   08/07/19 1911   Coping/Psychosocial   Plan of Care Reviewed With patient   Plan of Care Review   Progress no change   OTHER   Outcome Summary direct admit from dr. jean-baptiste's office. family at the bedside. currently on room air. prema Han called for admission orders.       Problem: Fall Risk (Adult)  Goal: Identify Related Risk Factors and Signs and Symptoms  Outcome: Ongoing (interventions implemented as appropriate)    Goal: Absence of Fall  Outcome: Ongoing (interventions implemented as appropriate)      Problem: Skin Injury Risk (Adult)  Goal: Identify Related Risk Factors and Signs and Symptoms  Outcome: Ongoing (interventions implemented as appropriate)    Goal: Skin Health and Integrity  Outcome: Ongoing (interventions implemented as appropriate)      Problem: Cardiac: Heart Failure (Adult)  Goal: Signs and Symptoms of Listed Potential Problems Will be Absent, Minimized or Managed (Cardiac: Heart Failure)  Outcome: Ongoing (interventions implemented as appropriate)

## 2019-08-07 NOTE — H&P
[facility]        Patient Name: Christiano Bryant  Age/Sex: 89 y.o. female  : 3/7/1923  MRN: [id number]    Date of Admission: 2019  Date of Encounter Visit: 19  Encounter Provider: KVNG Sykes  Referring Provider: Frida Ellis MD  Place of Service: [facility] CARDIOLOGY  Patient Care Team:  Randall Mccoy MD as PCP - General (Family Medicine)  Randall Mccoy MD as PCP - Claims Attributed    Subjective:     Chief Complaint: Shortness of breath, pedal edema, orthopnea, acute on chronic congestive heart failure.    History of Present Illness:  Christiano Bryant is a 89 y.o. female who presents today for evaluation of shortness of breath, edema, weight gain. Pt has a  history significant for congestive heart failure, hypertension, atrial fibrillation, TIA.  Patient called the office yesterday stating that she was having increased shortness of breath, weight gain and lower extremity edema.       Over the past 2-3 months patient started having lethargy, increased edema to the LE and more SOA.  Her diuretics were adjusted and symptoms did not improve. A few days ago the patient was continuing to sleep more frequently, have episodes of confusion.  Weight has increased, she has shortness of breath, increased SANTANA. She states that over the past month she has not slept well because of orthopnea.  She states that she has ease of breathing with sitting upright. She has gained 8-10 pounds since April.  Family states that she has a decreased appetite.        Cardiac Procedures:  1. Echocardiogram 3/21/2019.  Severe LVH.  Septal flattening and cyst delay and diastole consistent with RV pressure and volume overload.  Normal LV chamber size.  Normal LV systolic function.  EF 69%.  Diastolic dysfunction noted.  Moderately dilated RV.  RV systolic function is mildly depressed.  Severely dilated LA.  Moderate RA enlargement.  Moderate mitral annular  calcification.  2. Ultrasound-guided thoracentesis 3/21/2019.  Total of 1000 cc was removed from right pleural space.         Past Medical History:  Past Medical History:   Diagnosis Date   • Acute congestive heart failure (CMS/HCC)    • Anxiety    • Atrial fibrillation (CMS/HCC)    • Chronic diastolic (congestive) heart failure (CMS/HCC)    • CKD (chronic kidney disease), stage III (CMS/HCC)    • Dyslipidemia    • Elevated brain natriuretic peptide (BNP) level    • Elevated troponin    • Generalized weakness    • Glaucoma    • Hypertension    • Localized swelling of both lower legs    • Meningioma (CMS/HCC)    • Near syncope    • Neuropathy    • Paresthesia    • Pleural effusion due to CHF (congestive heart failure) (CMS/HCC)    • Renal insufficiency    • Short of breath on exertion    • Skin cancer    • TIA (transient ischemic attack)    • Weakness of lower extremity    • Weakness of right upper extremity        Past Surgical History:   Procedure Laterality Date   • APPENDECTOMY     • EYE SURGERY     • KNEE SURGERY Left     torn miniscus repair   • OOPHORECTOMY         Home Medications:   Medications Prior to Admission   Medication Sig Dispense Refill Last Dose   • aspirin 81 MG chewable tablet Chew 81 mg Daily.   8/7/2019 at Unknown time   • BELSOMRA 10 MG tablet TAKE 1 TABLET BY MOUTH EVERY EVENING 30 tablet 0 8/6/2019 at Unknown time   • furosemide (LASIX) 40 MG tablet TAKE 1 TABLET BY MOUTH TWICE DAILY 180 tablet 1 8/7/2019 at Unknown time   • melatonin 5 MG tablet tablet Take 5 mg by mouth Every Night.   8/6/2019 at Unknown time   • mirtazapine (REMERON) 15 MG tablet TAKE 1 TABLET BY MOUTH EVERY NIGHT AT BEDTIME 90 tablet 0 8/6/2019 at Unknown time   • potassium chloride (K-DUR) 10 MEQ CR tablet TAKE 1 TABLET BY MOUTH DAILY 90 tablet 1 8/7/2019 at Unknown time   • docusate sodium (COLACE) 100 MG capsule Take 100 mg by mouth As Needed for Constipation.   Taking   • dorzolamide-timolol (COSOPT) 22.3-6.8 MG/ML  ophthalmic solution Administer 1 drop to both eyes 2 (Two) Times a Day.  0 Taking   • guaiFENesin (MUCINEX) 600 MG 12 hr tablet Take 600 mg by mouth 2 (Two) Times a Day As Needed for Cough.   Unknown at Unknown time   • latanoprost (XALATAN) 0.005 % ophthalmic solution Administer 1 drop to both eyes Daily.  0 Taking   • pilocarpine (PILOCAR) 1 % ophthalmic solution Administer  to both eyes 2 (Two) Times a Day.   Taking   • senna (SENOKOT) 8.6 MG tablet tablet Take 1 tablet by mouth At Night As Needed for Constipation.   Unknown at Unknown time       Allergies:  Allergies   Allergen Reactions   • Penicillins Swelling       Past Social History:  Social History     Socioeconomic History   • Marital status:      Spouse name: Not on file   • Number of children: Not on file   • Years of education: Not on file   • Highest education level: Not on file   Tobacco Use   • Smoking status: Never Smoker   • Smokeless tobacco: Never Used   Substance and Sexual Activity   • Alcohol use: Yes     Alcohol/week: 2.4 oz     Types: 4 Shots of liquor per week     Comment: cristina 4/7 days   • Drug use: No   • Sexual activity: No       Past Family History:  Family History   Problem Relation Age of Onset   • Angina Mother    • Cancer Father    • No Known Problems Maternal Grandmother    • No Known Problems Maternal Grandfather    • No Known Problems Paternal Grandmother    • No Known Problems Paternal Grandfather        Review of Systems   Constitution: Positive for malaise/fatigue and weight gain.   Cardiovascular: Positive for dyspnea on exertion, leg swelling and orthopnea. Negative for chest pain.   Respiratory: Positive for cough, shortness of breath and sleep disturbances due to breathing.    Neurological: Negative for light-headedness.   All other systems reviewed and are negative.        Objective:   Heart Rate:  [67] 67  BP: (122)/(64) 122/64   No intake or output data in the 24 hours ending 08/07/19 2376  There is no  height or weight on file to calculate BMI.  There were no vitals filed for this visit.  Weight change:     Physical Exam   Constitutional: She is oriented to person, place, and time. Vital signs are normal. She appears well-developed and well-nourished. She is active.   Eyes: Conjunctivae are normal.   Neck: Carotid bruit is not present.   Cardiovascular: Normal rate, regular rhythm and normal heart sounds.   Pulmonary/Chest: Breath sounds normal.   Abdominal: Normal appearance.   Musculoskeletal:   No cyanosis, clubbing, edema  Normal ROM   Neurological: She is alert and oriented to person, place, and time. GCS eye subscore is 4. GCS verbal subscore is 5. GCS motor subscore is 6.   Skin: Skin is warm, dry and intact.   2+ pitting edema from the knees to the toes bilaterally.   Psychiatric: She has a normal mood and affect. Her speech is normal and behavior is normal. Judgment and thought content normal. Cognition and memory are normal.         Lab Review:   Results from last 7 days   Lab Units 08/07/19  1237   SODIUM mmol/L 141   POTASSIUM mmol/L 4.3   CHLORIDE mmol/L 99   CO2 mmol/L 33.6*   BUN mg/dL 32*   CREATININE mg/dL 1.20*   GLUCOSE mg/dL 84   CALCIUM mg/dL 10.1*                           Invalid input(s): LDLCALC            Echo EF Estimated  No results found for: ECHOEFEST    EKG:   I personally viewed and interpreted the patient's EKG    Imaging:  Imaging Results (most recent)     None              Assessment:       Atrial fibrillation (CMS/HCC)    Renal insufficiency    Acute on chronic diastolic heart failure (CMS/HCC)        Plan:     Admit to telemetry unit.  Patient needs IV diuretics.  Patient has mild renal insufficiency with creatinine 1.20 today.  Will need to check serial BMPs.  Will check UA to ensure patient does not have any urinary tract infection that could be contributing to renal insufficiency.  Patient had recent admission to [facility] where she had to have a  thoracentesis secondary to a right pleural effusion.  Will check a chest x-ray to evaluate for pleural effusions or volume overload.  Patient did have low oxygen saturations in the office at 91%.  Will order supplemental oxygen.  I am hopeful that with diuresis patient's oxygen level will improve.  We will check daily weights with strict I&O.    KVNG Sykes  [facility]  08/07/19  4:28 PM    EMR Dragon/Transcription disclaimer:   Much of this encounter note is an electronic transcription/translation of spoken language to printed text. The electronic translation of spoken language may permit erroneous, or at times, nonsensical words or phrases to be inadvertently transcribed; Although I have reviewed the note for such errors, some may still exist.

## 2019-08-07 NOTE — PROGRESS NOTES
[facility]      Patient Name: Christiano Bryant  :3/7/1923  Age: 89 y.o.  Primary Cardiologist: Frida Ellis MD  Encounter Provider:  KVNG Sykes      Chief Complaint:   Chief Complaint   Patient presents with   • Follow-up     Edema, SOA, 13lbs Weight Gain         Christiano Bryant is a 89 y.o. female who presents today for evaluation of shortness of breath, edema, weight gain. Pt has a  history significant for congestive heart failure, hypertension, atrial fibrillation, TIA.  Patient called the office yesterday stating that she was having increased shortness of breath, weight gain and lower extremity edema.      Over the past 2-3 months patient started having lethargy, increased edema to the LE and more SOA.  Her diuretics were adjusted and symptoms did not improve. A few days ago the patient was continuing to sleep more frequently, have episodes of confusion.  Weight has increased, she has shortness of breath, increased SANTANA. She states that over the past month she has not slept well because of orthopnea.  She states that she has ease of breathing with sitting upright. She has gained 8-10 pounds since April.  Family states that she has a decreased appetite.     The following portions of the patient's history were reviewed and updated as appropriate: allergies, current medications, past family history, past medical history, past social history, past surgical history and problem list.      Review of Systems   Constitution: Positive for malaise/fatigue and weight gain.   Cardiovascular: Positive for dyspnea on exertion, leg swelling and orthopnea. Negative for chest pain.   Respiratory: Positive for cough, shortness of breath and sleep disturbances due to breathing.    Musculoskeletal: Positive for falls.   Neurological: Positive for light-headedness.   All other systems reviewed and are negative.      OBJECTIVE:   Vital Signs  Vitals:    19 1321   BP: 122/64   Pulse: 67   SpO2: 91%  "    Estimated body mass index is 23.79 kg/m² as calculated from the following:    Height as of this encounter: 162.6 cm (64\").    Weight as of this encounter: 62.9 kg (138 lb 9.6 oz).    Physical Exam   Constitutional: She is oriented to person, place, and time. Vital signs are normal. She appears well-developed and well-nourished. She is active.   Eyes: Conjunctivae are normal.   Neck: Carotid bruit is not present.   Cardiovascular: Normal rate, regular rhythm and normal heart sounds.   Pulmonary/Chest: Breath sounds normal.   Abdominal: Normal appearance.   Musculoskeletal:   2+ pitting edema from the bilateral knees to the toes.   Neurological: She is alert and oriented to person, place, and time. GCS eye subscore is 4. GCS verbal subscore is 5. GCS motor subscore is 6.   Skin: Skin is warm, dry and intact.   Psychiatric: She has a normal mood and affect. Her speech is normal and behavior is normal. Judgment and thought content normal. Cognition and memory are normal.       Procedures    Cardiac Procedures:  1. Echocardiogram 3/21/2019.  Severe LVH.  Septal flattening and cyst delay and diastole consistent with RV pressure and volume overload.  Normal LV chamber size.  Normal LV systolic function.  EF 69%.  Diastolic dysfunction noted.  Moderately dilated RV.  RV systolic function is mildly depressed.  Severely dilated LA.  Moderate RA enlargement.  Moderate mitral annular calcification.  2. Ultrasound-guided thoracentesis 3/21/2019.  Total of 1000 cc was removed from right pleural space.        ASSESSMENT:      Diagnosis Plan   1. Acute on chronic combined systolic and diastolic congestive heart failure (CMS/HCC)     2. Renal insufficiency             PLAN OF CARE:     Patient admitted to the hospital.  Please see H&P for further details.      Heart Failure  Assessment  • NYHA class III-A - There is limitation of physical activity. The patient is comfortable at rest, but ordinary activity causes fatigue, " palpitations or shortness of breath.    Subjective/Objective  • The patient reports dyspnea and orthopnea          Atrial Fibrillation and Atrial Flutter  Assessment  • The patient has paroxysmal atrial fibrillation  • The patient's CHADS2-VASc score is 5  • A [id number] score of 2 or more is considered a high risk for a thromboembolic event    Plan  • Attempt to maintain sinus rhythm       Discharge Medications           Accurate as of 8/7/19  1:42 PM. If you have any questions, ask your nurse or doctor.               Continue These Medications      Instructions Start Date   aspirin 81 MG chewable tablet   81 mg, Oral, Daily      BELSOMRA 10 MG tablet  Generic drug:  Suvorexant   TAKE 1 TABLET BY MOUTH EVERY EVENING      docusate sodium 100 MG capsule  Commonly known as:  COLACE   100 mg, Oral, As Needed      dorzolamide-timolol 22.3-6.8 MG/ML ophthalmic solution  Commonly known as:  COSOPT   1 drop, Both Eyes, 2 Times Daily      furosemide 40 MG tablet  Commonly known as:  LASIX   TAKE 1 TABLET BY MOUTH TWICE DAILY      latanoprost 0.005 % ophthalmic solution  Commonly known as:  XALATAN   1 drop, Both Eyes, Daily      melatonin 5 MG tablet tablet   5 mg, Oral, Nightly      mirtazapine 15 MG tablet  Commonly known as:  REMERON   TAKE 1 TABLET BY MOUTH EVERY NIGHT AT BEDTIME      pilocarpine 1 % ophthalmic solution  Commonly known as:  PILOCAR   Both Eyes, 2 Times Daily      potassium chloride 10 MEQ CR tablet  Commonly known as:  K-DUR   10 mEq, Oral, Daily             Thank you for allowing me to participate in the care of your patient,      Sincerely,   KVNG Sykes  [facility]  08/07/19  1:42 PM    **Asher Disclaimer:**  Much of this encounter note is an electronic transcription/translation of spoken language to printed text. The electronic translation of spoken language may permit erroneous, or at times, nonsensical words or phrases to be inadvertently transcribed. Although I have  reviewed the note for such errors, some may still exist.

## 2019-08-08 ENCOUNTER — APPOINTMENT (OUTPATIENT)
Dept: CARDIOLOGY | Facility: HOSPITAL | Age: 84
End: 2019-08-08

## 2019-08-08 LAB
ANION GAP SERPL CALCULATED.3IONS-SCNC: 9 MMOL/L (ref 5–15)
AORTIC DIMENSIONLESS INDEX: 0.7 (DI)
BH CV ECHO MEAS - ACS: 1.6 CM
BH CV ECHO MEAS - AO MAX PG: 10.7 MMHG
BH CV ECHO MEAS - AO MEAN PG (FULL): 3 MMHG
BH CV ECHO MEAS - AO MEAN PG: 6 MMHG
BH CV ECHO MEAS - AO ROOT AREA (BSA CORRECTED): 1.6
BH CV ECHO MEAS - AO ROOT AREA: 5.7 CM^2
BH CV ECHO MEAS - AO ROOT DIAM: 2.7 CM
BH CV ECHO MEAS - AO V2 MAX: 163.2 CM/SEC
BH CV ECHO MEAS - AO V2 MEAN: 115 CM/SEC
BH CV ECHO MEAS - AO V2 VTI: 32.7 CM
BH CV ECHO MEAS - AVA(I,A): 1.5 CM^2
BH CV ECHO MEAS - AVA(I,D): 1.5 CM^2
BH CV ECHO MEAS - BSA(HAYCOCK): 1.7 M^2
BH CV ECHO MEAS - BSA: 1.7 M^2
BH CV ECHO MEAS - BZI_BMI: 21.5 KILOGRAMS/M^2
BH CV ECHO MEAS - BZI_METRIC_HEIGHT: 170.2 CM
BH CV ECHO MEAS - BZI_METRIC_WEIGHT: 62.1 KG
BH CV ECHO MEAS - EDV(CUBED): 39.3 ML
BH CV ECHO MEAS - EDV(MOD-SP2): 45 ML
BH CV ECHO MEAS - EDV(MOD-SP4): 46 ML
BH CV ECHO MEAS - EDV(TEICH): 47.4 ML
BH CV ECHO MEAS - EF(CUBED): 76.4 %
BH CV ECHO MEAS - EF(MOD-BP): 80 %
BH CV ECHO MEAS - EF(MOD-SP2): 80 %
BH CV ECHO MEAS - EF(MOD-SP4): 82.6 %
BH CV ECHO MEAS - EF(TEICH): 69.6 %
BH CV ECHO MEAS - ESV(CUBED): 9.3 ML
BH CV ECHO MEAS - ESV(MOD-SP2): 9 ML
BH CV ECHO MEAS - ESV(MOD-SP4): 8 ML
BH CV ECHO MEAS - ESV(TEICH): 14.4 ML
BH CV ECHO MEAS - FS: 38.2 %
BH CV ECHO MEAS - IVS/LVPW: 0.94
BH CV ECHO MEAS - IVSD: 1.5 CM
BH CV ECHO MEAS - LAT PEAK E' VEL: 8.6 CM/SEC
BH CV ECHO MEAS - LV DIASTOLIC VOL/BSA (35-75): 26.7 ML/M^2
BH CV ECHO MEAS - LV MASS(C)D: 196.4 GRAMS
BH CV ECHO MEAS - LV MASS(C)DI: 114.1 GRAMS/M^2
BH CV ECHO MEAS - LV MAX PG: 4.8 MMHG
BH CV ECHO MEAS - LV MEAN PG: 3 MMHG
BH CV ECHO MEAS - LV SYSTOLIC VOL/BSA (12-30): 4.6 ML/M^2
BH CV ECHO MEAS - LV V1 MAX: 109.8 CM/SEC
BH CV ECHO MEAS - LV V1 MEAN: 76.1 CM/SEC
BH CV ECHO MEAS - LV V1 VTI: 21.5 CM
BH CV ECHO MEAS - LVIDD: 3.4 CM
BH CV ECHO MEAS - LVIDS: 2.1 CM
BH CV ECHO MEAS - LVLD AP2: 6.4 CM
BH CV ECHO MEAS - LVLD AP4: 6.6 CM
BH CV ECHO MEAS - LVLS AP2: 5.4 CM
BH CV ECHO MEAS - LVLS AP4: 5 CM
BH CV ECHO MEAS - LVOT AREA (M): 2.3 CM^2
BH CV ECHO MEAS - LVOT AREA: 2.3 CM^2
BH CV ECHO MEAS - LVOT DIAM: 1.7 CM
BH CV ECHO MEAS - LVPWD: 1.6 CM
BH CV ECHO MEAS - MED PEAK E' VEL: 4.3 CM/SEC
BH CV ECHO MEAS - MR MAX PG: 41 MMHG
BH CV ECHO MEAS - MR MAX VEL: 320 CM/SEC
BH CV ECHO MEAS - MV DEC SLOPE: 753.5 CM/SEC^2
BH CV ECHO MEAS - MV DEC TIME: 0.16 SEC
BH CV ECHO MEAS - MV E MAX VEL: 103 CM/SEC
BH CV ECHO MEAS - MV MEAN PG: 2 MMHG
BH CV ECHO MEAS - MV P1/2T MAX VEL: 121.5 CM/SEC
BH CV ECHO MEAS - MV P1/2T: 47.2 MSEC
BH CV ECHO MEAS - MV V2 MEAN: 61.8 CM/SEC
BH CV ECHO MEAS - MV V2 VTI: 19 CM
BH CV ECHO MEAS - MVA P1/2T LCG: 1.8 CM^2
BH CV ECHO MEAS - MVA(P1/2T): 4.7 CM^2
BH CV ECHO MEAS - MVA(VTI): 2.6 CM^2
BH CV ECHO MEAS - PA MAX PG: 2 MMHG
BH CV ECHO MEAS - PA V2 MAX: 52.3 CM/SEC
BH CV ECHO MEAS - PI END-D VEL: 129 CM/SEC
BH CV ECHO MEAS - RAP SYSTOLE: 15 MMHG
BH CV ECHO MEAS - RV MEAN PG: 1 MMHG
BH CV ECHO MEAS - RV V1 MEAN: 49.4 CM/SEC
BH CV ECHO MEAS - RV V1 VTI: 15.6 CM
BH CV ECHO MEAS - RVSP: 41 MMHG
BH CV ECHO MEAS - SI(AO): 108.7 ML/M^2
BH CV ECHO MEAS - SI(CUBED): 17.4 ML/M^2
BH CV ECHO MEAS - SI(LVOT): 28.3 ML/M^2
BH CV ECHO MEAS - SI(MOD-SP2): 20.9 ML/M^2
BH CV ECHO MEAS - SI(MOD-SP4): 22.1 ML/M^2
BH CV ECHO MEAS - SI(TEICH): 19.2 ML/M^2
BH CV ECHO MEAS - SV(AO): 187.2 ML
BH CV ECHO MEAS - SV(CUBED): 30 ML
BH CV ECHO MEAS - SV(LVOT): 48.8 ML
BH CV ECHO MEAS - SV(MOD-SP2): 36 ML
BH CV ECHO MEAS - SV(MOD-SP4): 38 ML
BH CV ECHO MEAS - SV(TEICH): 33 ML
BH CV ECHO MEAS - TAPSE (>1.6): 1.2 CM2
BH CV ECHO MEAS - TR MAX PG: 26
BH CV ECHO MEAS - TR MAX VEL: 265 CM/SEC
BH CV ECHO MEASUREMENTS AVERAGE E/E' RATIO: 15.97
BH CV VAS BP RIGHT ARM: NORMAL MMHG
BH CV XLRA - RV BASE: 3.5 CM
BH CV XLRA - TDI S': 8.2 CM/SEC
BUN BLD-MCNC: 31 MG/DL (ref 8–23)
BUN/CREAT SERPL: 26.3 (ref 7–25)
CALCIUM SPEC-SCNC: 10.2 MG/DL (ref 8.2–9.6)
CHLORIDE SERPL-SCNC: 100 MMOL/L (ref 98–107)
CO2 SERPL-SCNC: 33 MMOL/L (ref 22–29)
CREAT BLD-MCNC: 1.18 MG/DL (ref 0.57–1)
GFR SERPL CREATININE-BSD FRML MDRD: 42 ML/MIN/1.73
GLUCOSE BLD-MCNC: 95 MG/DL (ref 65–99)
LEFT ATRIUM VOLUME INDEX: 44.2 ML/M2
LV EF 2D ECHO EST: 60 %
MAXIMAL PREDICTED HEART RATE: 124 BPM
POTASSIUM BLD-SCNC: 4.2 MMOL/L (ref 3.5–5.2)
SODIUM BLD-SCNC: 142 MMOL/L (ref 136–145)
STRESS TARGET HR: 105 BPM

## 2019-08-08 PROCEDURE — 99232 SBSQ HOSP IP/OBS MODERATE 35: CPT | Performed by: INTERNAL MEDICINE

## 2019-08-08 PROCEDURE — 93306 TTE W/DOPPLER COMPLETE: CPT

## 2019-08-08 PROCEDURE — 80048 BASIC METABOLIC PNL TOTAL CA: CPT | Performed by: NURSE PRACTITIONER

## 2019-08-08 PROCEDURE — 25010000002 FUROSEMIDE PER 20 MG: Performed by: NURSE PRACTITIONER

## 2019-08-08 PROCEDURE — 93306 TTE W/DOPPLER COMPLETE: CPT | Performed by: INTERNAL MEDICINE

## 2019-08-08 RX ADMIN — DOCUSATE SODIUM 100 MG: 100 CAPSULE, LIQUID FILLED ORAL at 12:15

## 2019-08-08 RX ADMIN — ASPIRIN 81 MG: 81 TABLET, CHEWABLE ORAL at 09:02

## 2019-08-08 RX ADMIN — SODIUM CHLORIDE, PRESERVATIVE FREE 3 ML: 5 INJECTION INTRAVENOUS at 20:53

## 2019-08-08 RX ADMIN — Medication 5 MG: at 20:53

## 2019-08-08 RX ADMIN — TIMOLOL MALEATE 1 DROP: 5 SOLUTION/ DROPS OPHTHALMIC at 20:53

## 2019-08-08 RX ADMIN — FUROSEMIDE 40 MG: 10 INJECTION, SOLUTION INTRAMUSCULAR; INTRAVENOUS at 18:48

## 2019-08-08 RX ADMIN — SODIUM CHLORIDE, PRESERVATIVE FREE 3 ML: 5 INJECTION INTRAVENOUS at 09:10

## 2019-08-08 RX ADMIN — MIRTAZAPINE 15 MG: 15 TABLET, FILM COATED ORAL at 20:53

## 2019-08-08 RX ADMIN — PILOCARPINE HYDROCHLORIDE 1 DROP: 10 SOLUTION/ DROPS OPHTHALMIC at 20:53

## 2019-08-08 RX ADMIN — POTASSIUM CHLORIDE 20 MEQ: 750 CAPSULE, EXTENDED RELEASE ORAL at 09:02

## 2019-08-08 RX ADMIN — FUROSEMIDE 40 MG: 10 INJECTION, SOLUTION INTRAMUSCULAR; INTRAVENOUS at 06:02

## 2019-08-08 NOTE — PLAN OF CARE
Problem: Patient Care Overview  Goal: Plan of Care Review  Outcome: Ongoing (interventions implemented as appropriate)   08/08/19 7988   Coping/Psychosocial   Plan of Care Reviewed With patient;daughter   Plan of Care Review   Progress improving   OTHER   Outcome Summary Alert and oriented x4, up with assist of 1, continues with 3-4+ pitting edema BLE, 2L per n/c, daughter to bedside, had echo today, nephro consulted, will CTM

## 2019-08-08 NOTE — PROGRESS NOTES
[facility]  Progress note: 2019    Patient Identification:  Name:Christiano Bryant  Age:89 y.o.  Sex: female  :  3/7/1923  MRN: [id number]           CC:  Congestive heart failure    Interval history:  Good diuresis overnight.  Vitals stable with stable renal function this morning.  Chest x-ray with only small pleural effusions.  Still has orthopnea and associated anxiety.  Echocardiogram suggestive of amyloid heart disease.    Vital Signs:   Temp:  [97.5 °F (36.4 °C)-98.2 °F (36.8 °C)] 98.2 °F (36.8 °C)  Heart Rate:  [65-75] 70  Resp:  [18] 18  BP: (121-133)/(54-84) 121/84    Intake/Output Summary (Last 24 hours) at 2019 1646  Last data filed at 2019 1500  Gross per 24 hour   Intake 240 ml   Output 1800 ml   Net -1560 ml     Physical Examination:    General Appearance No acute distress   Neck No adenopathy, supple, trachea midline, no thyromegaly, no carotid bruit, no JVD   Lungs Few rales at bases,respirations regular, even and unlabored   Heart Irregular rhythm and normal rate, normal S1 and S2, no murmur, no gallop, no rub, no click   Chest wall No abnormalities observed   Abdomen Normal bowel sounds, no masses, no hepatomegaly, soft   Extremities Moves all extremities well, 1+ edema, no cyanosis, no redness   Neurological Alert and oriented x 3     Lab Review:  Personally reviewed the labs, radiology imaging and other cardiac procedures.   Results from last 7 days   Lab Units 19  0545   SODIUM mmol/L 142   POTASSIUM mmol/L 4.2   CHLORIDE mmol/L 100   CO2 mmol/L 33.0*   BUN mg/dL 31*   CREATININE mg/dL 1.18*   CALCIUM mg/dL 10.2*   GLUCOSE mg/dL 95       Results from last 7 days   Lab Units 19  1702   WBC 10*3/mm3 5.32   HEMOGLOBIN g/dL 12.4   HEMATOCRIT % 41.4   PLATELETS 10*3/mm3 93*     Medication Review:   Meds reviewed  Scheduled Meds:    aspirin 81 mg Oral Daily   dorzolamide 1 drop Both Eyes Q12H   And      timolol 1 drop Both Eyes Q12H   furosemide 40 mg Intravenous Q12H    latanoprost 1 drop Both Eyes Daily   melatonin 5 mg Oral Nightly   mirtazapine 15 mg Oral Nightly   pilocarpine 1 drop Both Eyes BID   potassium chloride 20 mEq Oral Daily   sodium chloride 3 mL Intravenous Q12H     I personally viewed and interpreted the patient's EKG/Telemetry data    Assessment and Plan  1.  Congestive heart failure.  In part due to diastolic dysfunction but also concern about renal insufficiency and proteinuria.  Echocardiogram suspicious for amyloid heart disease.  We will asked nephrology to review and also consider fat aspirate biopsy  2.  Persistent atrial fibrillation.  Rates are controlled.  Felt to be a poor candidate for anticoagulation and has been only on aspirin therapy  3.  Renal insufficiency with proteinuria  4.  Hypertension, controlled    Mini Ellis  8/8/20197:55 AM  25min spent in reviewing records, discussion and examination of the patient and discussion with other members of the patient's medical team.     Dictated utilizing Dragon dictation

## 2019-08-08 NOTE — PLAN OF CARE
Problem: Patient Care Overview  Goal: Plan of Care Review  Outcome: Ongoing (interventions implemented as appropriate)   08/07/19 1911 08/08/19 7552   Coping/Psychosocial   Plan of Care Reviewed With patient --    OTHER   Outcome Summary --  Patient very anxious at times stating that she feels like she can not breath. O2 sats greater than 90 on room air. Placed nasal canula on 2L to alleviate patient anxiety. Patient had difficult time sleeping. Will continue to monitor.       Problem: Fall Risk (Adult)  Goal: Absence of Fall  Outcome: Ongoing (interventions implemented as appropriate)      Problem: Skin Injury Risk (Adult)  Goal: Skin Health and Integrity  Outcome: Ongoing (interventions implemented as appropriate)      Problem: Cardiac: Heart Failure (Adult)  Goal: Signs and Symptoms of Listed Potential Problems Will be Absent, Minimized or Managed (Cardiac: Heart Failure)  Outcome: Ongoing (interventions implemented as appropriate)

## 2019-08-09 LAB
ANION GAP SERPL CALCULATED.3IONS-SCNC: 8.1 MMOL/L (ref 5–15)
BUN BLD-MCNC: 31 MG/DL (ref 8–23)
BUN/CREAT SERPL: 25.8 (ref 7–25)
CALCIUM SPEC-SCNC: 9.6 MG/DL (ref 8.2–9.6)
CHLORIDE SERPL-SCNC: 98 MMOL/L (ref 98–107)
CO2 SERPL-SCNC: 33.9 MMOL/L (ref 22–29)
CREAT BLD-MCNC: 1.2 MG/DL (ref 0.57–1)
GFR SERPL CREATININE-BSD FRML MDRD: 42 ML/MIN/1.73
GLUCOSE BLD-MCNC: 113 MG/DL (ref 65–99)
POTASSIUM BLD-SCNC: 5 MMOL/L (ref 3.5–5.2)
SODIUM BLD-SCNC: 140 MMOL/L (ref 136–145)

## 2019-08-09 PROCEDURE — 25010000002 FUROSEMIDE PER 20 MG: Performed by: NURSE PRACTITIONER

## 2019-08-09 PROCEDURE — 80048 BASIC METABOLIC PNL TOTAL CA: CPT | Performed by: NURSE PRACTITIONER

## 2019-08-09 PROCEDURE — 99232 SBSQ HOSP IP/OBS MODERATE 35: CPT | Performed by: NURSE PRACTITIONER

## 2019-08-09 RX ORDER — FUROSEMIDE 10 MG/ML
40 INJECTION INTRAMUSCULAR; INTRAVENOUS EVERY 8 HOURS
Status: DISCONTINUED | OUTPATIENT
Start: 2019-08-10 | End: 2019-08-11

## 2019-08-09 RX ADMIN — FUROSEMIDE 40 MG: 10 INJECTION, SOLUTION INTRAMUSCULAR; INTRAVENOUS at 05:42

## 2019-08-09 RX ADMIN — POTASSIUM CHLORIDE 20 MEQ: 750 CAPSULE, EXTENDED RELEASE ORAL at 09:20

## 2019-08-09 RX ADMIN — TIMOLOL MALEATE 1 DROP: 5 SOLUTION/ DROPS OPHTHALMIC at 20:55

## 2019-08-09 RX ADMIN — DORZOLAMIDE HYDROCHLORIDE 1 DROP: 20 SOLUTION/ DROPS OPHTHALMIC at 06:02

## 2019-08-09 RX ADMIN — SODIUM CHLORIDE, PRESERVATIVE FREE 3 ML: 5 INJECTION INTRAVENOUS at 10:07

## 2019-08-09 RX ADMIN — FUROSEMIDE 40 MG: 10 INJECTION, SOLUTION INTRAMUSCULAR; INTRAVENOUS at 17:43

## 2019-08-09 RX ADMIN — SODIUM CHLORIDE, PRESERVATIVE FREE 3 ML: 5 INJECTION INTRAVENOUS at 20:56

## 2019-08-09 RX ADMIN — PILOCARPINE HYDROCHLORIDE 1 DROP: 10 SOLUTION/ DROPS OPHTHALMIC at 20:55

## 2019-08-09 RX ADMIN — Medication 5 MG: at 20:55

## 2019-08-09 RX ADMIN — ASPIRIN 81 MG: 81 TABLET, CHEWABLE ORAL at 09:20

## 2019-08-09 RX ADMIN — MIRTAZAPINE 15 MG: 15 TABLET, FILM COATED ORAL at 20:55

## 2019-08-09 NOTE — PROGRESS NOTES
"Discharge Planning Assessment  [facility]     Patient Name: Christiano Bryant  MRN: [id number]  Today's Date: 8/9/2019    Admit Date: 8/7/2019    Discharge Needs Assessment     Row Name 08/09/19 1453       Living Environment    Lives With  alone    Current Living Arrangements  home/apartment/condo    Primary Care Provided by  self    Provides Primary Care For  no one    Family Caregiver if Needed  child(charlette), adult    Family Caregiver Names  son, Lyle Bryant and daughter, Meghana Burciaga    Quality of Family Relationships  helpful;involved;supportive    Able to Return to Prior Arrangements  yes       Resource/Environmental Concerns    Resource/Environmental Concerns  none       Transition Planning    Patient/Family Anticipates Transition to  home with help/services    Patient/Family Anticipated Services at Transition  home health care    Transportation Anticipated  family or friend will provide       Discharge Needs Assessment    Concerns to be Addressed  discharge planning    Equipment Currently Used at Home  walker, rolling    Outpatient/Agency/Support Group Needs  homecare agency    Discharge Facility/Level of Care Needs  home with home health    [facility]        Discharge Plan     Row Name 08/09/19 1455       Plan    Plan  Home with [facility]     Patient/Family in Agreement with Plan  yes    Plan Comments  IMM letter checked. CCP met with pt and son (Lyle Bryant, [phone]) to discuss d/c planning. Facesheet verified. CCP role explained. Pt resides alone in a \"mostly\" single level home with chair lift to access any steps. Pt uses walker for mobility, has used [facility] for past home health, and denies past sub-acute rehab. Pt confirms pharmacy is [facility] on [facility]. Pt reports assist from son, daughter and daughter-in-law with daily living needs regularly. Pt/son request home health referral to [facility] (placed in [facility]) for home PT following d/c. CCP to " follow to assist should additional d/c needs arise. Yarelis Mckeon LCSW        Destination      No service coordination in this encounter.      Durable Medical Equipment      No service coordination in this encounter.      Dialysis/Infusion      No service coordination in this encounter.      Home Medical Care      Service Provider Request Status Selected Services Address Phone Number Fax Number    [facility] - Request Sent N/A [address], [address], [facility] [address] [phone]      Therapy      No service coordination in this encounter.      Community Resources      No service coordination in this encounter.          Demographic Summary     Row Name 08/09/19 1452       General Information    Admission Type  inpatient    Arrived From  home    Required Notices Provided  Important Message from Medicare    Referral Source  admission list    Reason for Consult  discharge planning    Preferred Language  English        Functional Status     Row Name 08/09/19 1452       Functional Status    Usual Activity Tolerance  moderate    Current Activity Tolerance  moderate       Functional Status, IADL    Medications  assistive person    Meal Preparation  assistive person    Housekeeping  assistive person    Laundry  assistive person    Shopping  assistive person       Mental Status Summary    Recent Changes in Mental Status/Cognitive Functioning  no changes        Psychosocial    No documentation.       Abuse/Neglect    No documentation.       Legal    No documentation.       Substance Abuse    No documentation.       Patient Forms    No documentation.           Amanda Mckeon LCSW

## 2019-08-09 NOTE — PROGRESS NOTES
LOS: 2 days   Patient Care Team:  Randall Mccoy MD as PCP - General (Family Medicine)  Randall Mccoy MD as PCP - Claims Attributed      Chief Complaint:  Congestive heart failure       Interval History:   Vital signs stable.  Net I&O still negative.  Nephrology to see patient today.   Patient reports improvement of shortness of breath symptoms but still some shortness of breath with exertion.  No orthopnea or PND.  Still with some trace to mild pitting edema.  She does not feel that fat aspirate biopsy to detect for amyloid is needed at this time.          Objective   Vital Signs  Temp:  [97.8 °F (36.6 °C)-98.3 °F (36.8 °C)] 98.3 °F (36.8 °C)  Heart Rate:  [62-72] 70  Resp:  [18] 18  BP: (121-124)/(52-84) 122/57    Intake/Output Summary (Last 24 hours) at 8/9/2019 0755  Last data filed at 8/9/2019 0707  Gross per 24 hour   Intake 240 ml   Output 900 ml   Net -660 ml           Physical Exam   Constitutional: She is oriented to person, place, and time. She appears well-developed and well-nourished. No distress.   Neck: Neck supple. No JVD present.   Cardiovascular: Normal rate, normal heart sounds and intact distal pulses. An irregularly irregular rhythm present.   Pulmonary/Chest: Effort normal and breath sounds normal. No respiratory distress.   Abdominal: Soft. Bowel sounds are normal. There is no tenderness. There is no guarding.   Musculoskeletal: She exhibits edema (Trace to 1+, bilateral ankles). She exhibits no tenderness.   Neurological: She is alert and oriented to person, place, and time.   Skin: Skin is warm and dry. No erythema.   Psychiatric: She has a normal mood and affect.           Results Review:      Results from last 7 days   Lab Units 08/09/19  0505 08/08/19  0545 08/07/19  1702   SODIUM mmol/L 140 142 141   POTASSIUM mmol/L 5.0 4.2 4.3   CHLORIDE mmol/L 98 100 100   CO2 mmol/L 33.9* 33.0* 26.6   BUN mg/dL 31* 31* 29*   CREATININE mg/dL 1.20* 1.18* 1.17*   GLUCOSE mg/dL 113* 95 81    CALCIUM mg/dL 9.6 10.2* 10.0*         Results from last 7 days   Lab Units 08/07/19  1702   WBC 10*3/mm3 5.32   HEMOGLOBIN g/dL 12.4   HEMATOCRIT % 41.4   PLATELETS 10*3/mm3 93*                                 Medication Review:     aspirin 81 mg Oral Daily   dorzolamide 1 drop Both Eyes Q12H   And      timolol 1 drop Both Eyes Q12H   furosemide 40 mg Intravenous Q12H   latanoprost 1 drop Both Eyes Daily   melatonin 5 mg Oral Nightly   mirtazapine 15 mg Oral Nightly   pilocarpine 1 drop Both Eyes BID   potassium chloride 20 mEq Oral Daily   sodium chloride 3 mL Intravenous Q12H                Assessment/Plan     1.  Congestive heart failure:  [place] to be in part from diastolic dysfunction.  There are also concerns regarding renal insufficiency and proteinuria.  Nephrology has been consulted and will see patient today.  Creatinine up slightly this AM-- will continue to monitor.     2.  Persistent atrial fibrillation: Heart rate remains controlled.  Patient felt to be a poor anticoagulation candidate.  Remains on aspirin therapy.    3.  Renal insufficiency and proteinuria: As above.  Nephrology to see today.  Appreciate their input.    4.  Hypertension: Blood pressures remain well controlled during hospitalization.  We will continue to monitor.    5.  Echocardiogram suspicious for amyloid heart disease: Consider fat aspirate biopsy.  Discussed possible fat aspirate biopsy with patient however she does not feel that it is needed at this time.              Thanks,    Zainab Perez DNP, APRN  08/09/19  7:55 AM

## 2019-08-09 NOTE — PLAN OF CARE
Problem: Patient Care Overview  Goal: Plan of Care Review  Outcome: Ongoing (interventions implemented as appropriate)   08/09/19 6448   Coping/Psychosocial   Plan of Care Reviewed With patient   Plan of Care Review   Progress no change   OTHER   Outcome Summary Patient requiring 2L oxygen overnight. Patient desating into the 80's while sleeping. Still with SOA with minimal exertion. Patient requiring assistance with ambulation d/t weakness. BLE edema better. Nephrology to see today. Will continue to monitor.        Problem: Fall Risk (Adult)  Goal: Absence of Fall  Outcome: Ongoing (interventions implemented as appropriate)      Problem: Skin Injury Risk (Adult)  Goal: Skin Health and Integrity  Outcome: Ongoing (interventions implemented as appropriate)      Problem: Cardiac: Heart Failure (Adult)  Goal: Signs and Symptoms of Listed Potential Problems Will be Absent, Minimized or Managed (Cardiac: Heart Failure)  Outcome: Ongoing (interventions implemented as appropriate)

## 2019-08-09 NOTE — DISCHARGE PLACEMENT REQUEST
"Christiano Acosta (89 y.o. Female)     Date of Birth Social Security Number Address Home Phone MRN    03/07/1923  [address] [address] [phone] [id number]    Yarsani Marital Status          Buddhist        Admission Date Admission Type Admitting Provider Attending Provider Department, Room/Bed    8/7/19 Urgent Frida Ellis MD Das, Mini K, MD [address], [address]    Discharge Date Discharge Disposition Discharge Destination                       Attending Provider:  Frida Ellis MD    Allergies:  Penicillins    Isolation:  None   Infection:  None   Code Status:  CPR    Ht:  170.2 cm (67\")   Wt:  64.1 kg (141 lb 5 oz)    Admission Cmt:  None   Principal Problem:  None                Active Insurance as of 8/7/2019     Primary Coverage     Payor Plan Insurance Group Employer/Plan Group    MEDICARE RAILROAD MEDICARE      Payor Plan Address Payor Plan Phone Number Payor Plan Fax Number Effective Dates    [address] [phone]  3/1/1988 - None Entered    [facility] [id number]       Subscriber Name Subscriber Birth Date Member ID       CHRISTIANO ACOSTA 3/7/1923 [id number]           Secondary Coverage     Payor Plan Insurance Group Employer/Plan Group    [facility] SUPP [id number]     Payor Plan Address Payor Plan Phone Number Payor Plan Fax Number Effective Dates    [address]   12/1/2016 - None Entered    [facility] [id number]       Subscriber Name Subscriber Birth Date Member ID       CHRISTIANO ACOSTA 3/7/1923 [id number]                 Emergency Contacts      (Rel.) Home Phone Work Phone Mobile Phone    Meghana Burciaga (Daughter) [phone] -- [phone]    Lyle Acosta (Son) -- -- [phone]              "

## 2019-08-10 LAB
ANION GAP SERPL CALCULATED.3IONS-SCNC: 9.9 MMOL/L (ref 5–15)
BUN BLD-MCNC: 32 MG/DL (ref 8–23)
BUN/CREAT SERPL: 27.1 (ref 7–25)
CALCIUM SPEC-SCNC: 9.7 MG/DL (ref 8.2–9.6)
CHLORIDE SERPL-SCNC: 94 MMOL/L (ref 98–107)
CO2 SERPL-SCNC: 32.1 MMOL/L (ref 22–29)
CREAT BLD-MCNC: 1.18 MG/DL (ref 0.57–1)
GFR SERPL CREATININE-BSD FRML MDRD: 42 ML/MIN/1.73
GLUCOSE BLD-MCNC: 107 MG/DL (ref 65–99)
MAGNESIUM SERPL-MCNC: 2.4 MG/DL (ref 1.7–2.3)
NT-PROBNP SERPL-MCNC: ABNORMAL PG/ML (ref 5–1800)
POTASSIUM BLD-SCNC: 4.5 MMOL/L (ref 3.5–5.2)
SODIUM BLD-SCNC: 136 MMOL/L (ref 136–145)
TSH SERPL DL<=0.05 MIU/L-ACNC: 1.52 MIU/ML (ref 0.27–4.2)

## 2019-08-10 PROCEDURE — 84443 ASSAY THYROID STIM HORMONE: CPT | Performed by: INTERNAL MEDICINE

## 2019-08-10 PROCEDURE — 25010000002 FUROSEMIDE PER 20 MG: Performed by: INTERNAL MEDICINE

## 2019-08-10 PROCEDURE — 80048 BASIC METABOLIC PNL TOTAL CA: CPT | Performed by: NURSE PRACTITIONER

## 2019-08-10 PROCEDURE — 99233 SBSQ HOSP IP/OBS HIGH 50: CPT | Performed by: INTERNAL MEDICINE

## 2019-08-10 PROCEDURE — 83735 ASSAY OF MAGNESIUM: CPT | Performed by: INTERNAL MEDICINE

## 2019-08-10 PROCEDURE — 83880 ASSAY OF NATRIURETIC PEPTIDE: CPT | Performed by: NURSE PRACTITIONER

## 2019-08-10 RX ORDER — GUAIFENESIN 600 MG/1
600 TABLET, EXTENDED RELEASE ORAL EVERY 12 HOURS SCHEDULED
Status: DISCONTINUED | OUTPATIENT
Start: 2019-08-10 | End: 2019-08-12 | Stop reason: HOSPADM

## 2019-08-10 RX ADMIN — TIMOLOL MALEATE 1 DROP: 5 SOLUTION/ DROPS OPHTHALMIC at 09:41

## 2019-08-10 RX ADMIN — DORZOLAMIDE HYDROCHLORIDE 1 DROP: 20 SOLUTION/ DROPS OPHTHALMIC at 17:12

## 2019-08-10 RX ADMIN — POTASSIUM CHLORIDE 20 MEQ: 750 CAPSULE, EXTENDED RELEASE ORAL at 09:41

## 2019-08-10 RX ADMIN — FUROSEMIDE 40 MG: 10 INJECTION, SOLUTION INTRAMUSCULAR; INTRAVENOUS at 09:41

## 2019-08-10 RX ADMIN — ASPIRIN 81 MG: 81 TABLET, CHEWABLE ORAL at 09:41

## 2019-08-10 RX ADMIN — DOCUSATE SODIUM 100 MG: 100 CAPSULE, LIQUID FILLED ORAL at 02:04

## 2019-08-10 RX ADMIN — SENNA 8.6 MG: 8.6 TABLET, COATED ORAL at 02:04

## 2019-08-10 RX ADMIN — DORZOLAMIDE HYDROCHLORIDE 1 DROP: 20 SOLUTION/ DROPS OPHTHALMIC at 06:37

## 2019-08-10 RX ADMIN — TIMOLOL MALEATE 1 DROP: 5 SOLUTION/ DROPS OPHTHALMIC at 20:57

## 2019-08-10 RX ADMIN — MIRTAZAPINE 15 MG: 15 TABLET, FILM COATED ORAL at 20:57

## 2019-08-10 RX ADMIN — PILOCARPINE HYDROCHLORIDE 1 DROP: 10 SOLUTION/ DROPS OPHTHALMIC at 09:41

## 2019-08-10 RX ADMIN — Medication 5 MG: at 20:57

## 2019-08-10 RX ADMIN — GUAIFENESIN 600 MG: 600 TABLET, EXTENDED RELEASE ORAL at 20:57

## 2019-08-10 RX ADMIN — SODIUM CHLORIDE, PRESERVATIVE FREE 3 ML: 5 INJECTION INTRAVENOUS at 09:41

## 2019-08-10 RX ADMIN — FUROSEMIDE 40 MG: 10 INJECTION, SOLUTION INTRAMUSCULAR; INTRAVENOUS at 17:13

## 2019-08-10 RX ADMIN — FUROSEMIDE 40 MG: 10 INJECTION, SOLUTION INTRAMUSCULAR; INTRAVENOUS at 02:04

## 2019-08-10 RX ADMIN — PILOCARPINE HYDROCHLORIDE 1 DROP: 10 SOLUTION/ DROPS OPHTHALMIC at 20:57

## 2019-08-10 RX ADMIN — SODIUM CHLORIDE, PRESERVATIVE FREE 3 ML: 5 INJECTION INTRAVENOUS at 20:57

## 2019-08-10 NOTE — PROGRESS NOTES
"   LOS: 3 days    Patient Care Team:  Randall Mccoy MD as PCP - General (Family Medicine)  Randall Mccoy MD as PCP - Claims Attributed    Chief Complaint:  No chief complaint on file.      Subjective     Interval History:   Patient is breathing better with less shortness of breath.  She still complains of dependent edema.    Review of Systems:    As above    Objective     Vital Signs  Temp:  [97.5 °F (36.4 °C)-99.9 °F (37.7 °C)] 97.7 °F (36.5 °C)  Heart Rate:  [74-90] 90  Resp:  [18] 18  BP: (114-130)/(56-80) 114/56    Flowsheet Rows      First Filed Value   Admission Height  170.2 cm (67\") Documented at 08/07/2019 1945   Admission Weight  62.6 kg (138 lb 0.1 oz) Documented at 08/07/2019 1945          I/O this shift:  In: -   Out: 500 [Urine:500]  I/O last 3 completed shifts:  In: 240 [P.O.:240]  Out: 1100 [Urine:1100]    Intake/Output Summary (Last 24 hours) at 8/10/2019 1606  Last data filed at 8/10/2019 1121  Gross per 24 hour   Intake --   Output 800 ml   Net -800 ml       Physical Exam:  General Appearance: alert, oriented x 3, no acute distress,   Skin: warm and dry  HEENT: pupils round and reactive to light, oral mucosa normal,   Neck: supple, no JVD, trachea midline  Lungs: Bilateral crackles in both bases no rhonchi or wheezes  Heart: RRR, normal S1 and S2, no S3, no rub  Abdomen: soft, non-tender, no palpable bladder, present bowel sounds to auscultation  Extremities: 2+ edema with no cyanosis or clubbing  Neuro: normal speech and mental status          Results Review:    Results from last 7 days   Lab Units 08/10/19  0616 08/09/19  0505 08/08/19  0545   SODIUM mmol/L 136 140 142   POTASSIUM mmol/L 4.5 5.0 4.2   CHLORIDE mmol/L 94* 98 100   CO2 mmol/L 32.1* 33.9* 33.0*   BUN mg/dL 32* 31* 31*   CREATININE mg/dL 1.18* 1.20* 1.18*   CALCIUM mg/dL 9.7* 9.6 10.2*   GLUCOSE mg/dL 107* 113* 95       Estimated Creatinine Clearance: 28 mL/min (A) (by C-G formula based on SCr of 1.18 mg/dL (H)).    Results from " last 7 days   Lab Units 08/10/19  0616   MAGNESIUM mg/dL 2.4*             Results from last 7 days   Lab Units 08/07/19  1702   WBC 10*3/mm3 5.32   HEMOGLOBIN g/dL 12.4   PLATELETS 10*3/mm3 93*               Imaging Results (last 24 hours)     ** No results found for the last 24 hours. **          aspirin 81 mg Oral Daily   dorzolamide 1 drop Both Eyes Q12H   And      timolol 1 drop Both Eyes Q12H   furosemide 40 mg Intravenous Q8H   latanoprost 1 drop Both Eyes Daily   melatonin 5 mg Oral Nightly   mirtazapine 15 mg Oral Nightly   pilocarpine 1 drop Both Eyes BID   potassium chloride 20 mEq Oral Daily   sodium chloride 3 mL Intravenous Q12H          Medication Review:   Current Facility-Administered Medications   Medication Dose Route Frequency Provider Last Rate Last Dose   • aspirin chewable tablet 81 mg  81 mg Oral Daily Darleen Rahman APRN   81 mg at 08/10/19 0941   • docusate sodium (COLACE) capsule 100 mg  100 mg Oral PRN Darleen Rahman APRN   100 mg at 08/10/19 0204   • dorzolamide (TRUSOPT) 2 % ophthalmic solution 1 drop  1 drop Both Eyes Q12H Darleen Rahman APRN   1 drop at 08/10/19 0637    And   • timolol (TIMOPTIC) 0.5 % ophthalmic solution 1 drop  1 drop Both Eyes Q12H Darleen Rahman APRN   1 drop at 08/10/19 0941   • furosemide (LASIX) injection 40 mg  40 mg Intravenous Q8H Haider Perez MD   40 mg at 08/10/19 0941   • latanoprost (XALATAN) 0.005 % ophthalmic solution 1 drop  1 drop Both Eyes Daily Darleen Rahman APRN       • melatonin tablet 5 mg  5 mg Oral Nightly Darleen Rahman APRN   5 mg at 08/09/19 2055   • mirtazapine (REMERON) tablet 15 mg  15 mg Oral Nightly Darleen Rahman, APRN   15 mg at 08/09/19 2055   • pilocarpine (PILOCAR) 1 % ophthalmic solution 1 drop  1 drop Both Eyes BID Darleen Rahman APRN   1 drop at 08/10/19 0941   • potassium chloride (MICRO-K) CR capsule 20 mEq  20 mEq Oral Daily Darleen Rahman APRN   20 mEq at 08/10/19 0941   • senna  (SENOKOT) tablet 8.6 mg  1 tablet Oral Nightly PRN Darleen Rahman APRN   8.6 mg at 08/10/19 0204   • sodium chloride 0.9 % flush 3 mL  3 mL Intravenous Q12H Darleen Rahman APRN   3 mL at 08/10/19 0941   • sodium chloride 0.9 % flush 3-10 mL  3-10 mL Intravenous PRN Darleen Rahman APRN           Assessment/Plan   Impression:   Chronic kidney disease stage III  CHF  Thrombocytopenia    Plan:  Continue IV furosemide.  We will monitor potassium.  Restrict salt water intake.  TSH level is within normal limits  Will monitor fluid electrolyte status.  We will follow    Derik Silva MD  08/10/19  4:06 PM

## 2019-08-10 NOTE — PROGRESS NOTES
[facility]  Progress note: 8/10/2019    Patient Identification:  Name:Christiano Bryant  Age:89 y.o.  Sex: female  :  3/7/1923  MRN: [id number]           CC:  Congestive heart failure, atrial fibrillation    Interval history:  Dyspnea slightly improved but still quite significant with minimal ambulation.  No chest pain.  BNP actually higher today    Vital Signs:   Temp:  [97.7 °F (36.5 °C)-99.9 °F (37.7 °C)] 97.7 °F (36.5 °C)  Heart Rate:  [69-90] 69  Resp:  [16-18] 16  BP: (114-154)/(56-75) 154/72    Intake/Output Summary (Last 24 hours) at 8/10/2019 2055  Last data filed at 8/10/2019 1902  Gross per 24 hour   Intake --   Output 1000 ml   Net -1000 ml       Physical Examination:    General Appearance No acute distress   Neck No adenopathy, supple, trachea midline, no thyromegaly, no carotid bruit, no JVD   Lungs  dullness at both bases,respirations regular, even and unlabored   Heart Irregular rhythm and normal rate, normal S1 and S2, 2/6 systolic murmur, no gallop, no rub, no click   Chest wall No abnormalities observed   Abdomen Normal bowel sounds, no masses, no hepatomegaly, soft   Extremities Moves all extremities well, no edema, no cyanosis, no redness   Neurological Alert and oriented x 3     Lab Review:  Personally reviewed the labs, radiology imaging and other cardiac procedures.   Results from last 7 days   Lab Units 08/10/19  0616   SODIUM mmol/L 136   POTASSIUM mmol/L 4.5   CHLORIDE mmol/L 94*   CO2 mmol/L 32.1*   BUN mg/dL 32*   CREATININE mg/dL 1.18*   CALCIUM mg/dL 9.7*   GLUCOSE mg/dL 107*         )  Results from last 7 days   Lab Units 19  1702   WBC 10*3/mm3 5.32   HEMOGLOBIN g/dL 12.4   HEMATOCRIT % 41.4   PLATELETS 10*3/mm3 93*     Medication Review:   Meds reviewed  Scheduled Meds:    aspirin 81 mg Oral Daily   dorzolamide 1 drop Both Eyes Q12H   And      timolol 1 drop Both Eyes Q12H   furosemide 40 mg Intravenous Q8H   guaiFENesin 600 mg Oral Q12H   latanoprost 1 drop Both Eyes  Daily   melatonin 5 mg Oral Nightly   mirtazapine 15 mg Oral Nightly   pilocarpine 1 drop Both Eyes BID   potassium chloride 20 mEq Oral Daily   sodium chloride 3 mL Intravenous Q12H     I personally viewed and interpreted the patient's EKG/Telemetry data    Assessment and Plan  1.  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  Care complicated by renal insufficiency and diuresis being addressed by nephrology.  I had a discussion with the patient regarding possible amyloid heart disease.  Given her age and comorbidities I am not sure what there will be to offer her if this diagnosis was pursued further and made.  Her dyspnea however is quite significant.  With this in mind we will ask oncology to review for possible treatment options prior to further testing such as fat aspirate biopsy.  2.  Persistent atrial fibrillation.  Poor candidate for anticoagulation  3.  Renal insufficiency with proteinuria  4.  Hypertension  5.  Thrombocytopenia, recheck in a.m.    Frida Ellis  8/10/12052:11 PM  35min spent in reviewing records, discussion and examination of the patient and discussion with other members of the patient's medical team.     Dictated utilizing Dragon dictation

## 2019-08-10 NOTE — CONSULTS
Referring Provider: Dr. Friad Ellis  Reason for Consultation: CKD3    Subjective     Chief complaint No chief complaint on file.      History of present illness:  89 yo WF with likely CKD3 (baseline SCR appears to be 1.1-1.3) admitted 8/7/2019 for further evaluation of shortness of breath and worsening leg swelling.  We will consulted for recommendations on diuretics in face of elevated SCR 1.2.   PMH outlined below.  Hospital course: IV Lasix on board with good diuresis thus far.  Her symptoms have improved, though she remains on 3 L/min.  Review of systems of questionable reliability as she is a bit confused, unable to tell me the month or year.  · She denies any urinary complaints  · No prior history of stones, heavy NSAID use, or chronic UTIs  · She believes her weight has been stable for the last few months, but describes overall tremendous weight loss over the last several years (on the order of 30 pounds)  · Appetite is fair; no nausea or vomiting  · Denies orthopnea and chest pain  · No fever or chills; no cough  · Reports compliance with home-dose furosemide 40 mg twice daily, but admits to many salt indiscretions with      Past Medical History:   Diagnosis Date   • Acute congestive heart failure (CMS/HCC)    • Anxiety    • Atrial fibrillation (CMS/HCC)    • Chronic diastolic (congestive) heart failure (CMS/HCC)    • CKD (chronic kidney disease), stage III (CMS/HCC)    • Dyslipidemia    • Elevated brain natriuretic peptide (BNP) level    • Elevated troponin    • Generalized weakness    • Glaucoma    • Hypertension    • Localized swelling of both lower legs    • Meningioma (CMS/HCC)    • Near syncope    • Neuropathy    • Paresthesia    • Pleural effusion due to CHF (congestive heart failure) (CMS/HCC)    • Renal insufficiency    • Short of breath on exertion    • Skin cancer    • TIA (transient ischemic attack)    • Weakness of lower extremity    • Weakness of right upper extremity      Past Surgical History:    Procedure Laterality Date   • APPENDECTOMY     • EYE SURGERY     • KNEE SURGERY Left     torn miniscus repair   • OOPHORECTOMY       Family History   Problem Relation Age of Onset   • Angina Mother    • Cancer Father    • No Known Problems Maternal Grandmother    • No Known Problems Maternal Grandfather    • No Known Problems Paternal Grandmother    • No Known Problems Paternal Grandfather      Social History     Tobacco Use   • Smoking status: Never Smoker   • Smokeless tobacco: Never Used   Substance Use Topics   • Alcohol use: Yes     Alcohol/week: 2.4 oz     Types: 4 Shots of liquor per week     Comment: cristina 4/7 days   • Drug use: No     Medications Prior to Admission   Medication Sig Dispense Refill Last Dose   • aspirin 81 MG chewable tablet Chew 81 mg Daily.   8/7/2019 at Unknown time   • BELSOMRA 10 MG tablet TAKE 1 TABLET BY MOUTH EVERY EVENING 30 tablet 0 8/6/2019 at Unknown time   • furosemide (LASIX) 40 MG tablet TAKE 1 TABLET BY MOUTH TWICE DAILY 180 tablet 1 8/7/2019 at Unknown time   • melatonin 5 MG tablet tablet Take 5 mg by mouth Every Night.   8/6/2019 at Unknown time   • mirtazapine (REMERON) 15 MG tablet TAKE 1 TABLET BY MOUTH EVERY NIGHT AT BEDTIME 90 tablet 0 8/6/2019 at Unknown time   • potassium chloride (K-DUR) 10 MEQ CR tablet TAKE 1 TABLET BY MOUTH DAILY 90 tablet 1 8/7/2019 at Unknown time   • docusate sodium (COLACE) 100 MG capsule Take 100 mg by mouth As Needed for Constipation.   Taking   • dorzolamide-timolol (COSOPT) 22.3-6.8 MG/ML ophthalmic solution Administer 1 drop to both eyes 2 (Two) Times a Day.  0 Taking   • guaiFENesin (MUCINEX) 600 MG 12 hr tablet Take 600 mg by mouth 2 (Two) Times a Day As Needed for Cough.   Unknown at Unknown time   • latanoprost (XALATAN) 0.005 % ophthalmic solution Administer 1 drop to both eyes Daily.  0 Taking   • pilocarpine (PILOCAR) 1 % ophthalmic solution Administer  to both eyes 2 (Two) Times a Day.   Taking   • senna (SENOKOT) 8.6  "MG tablet tablet Take 1 tablet by mouth At Night As Needed for Constipation.   Unknown at Unknown time     Allergies:  Penicillins    Review of Systems  14-point ROS performed and all negative except for pertinent +/-'s detailed in HPI.     Objective     Vital Signs  Temp:  [97.5 °F (36.4 °C)-98.3 °F (36.8 °C)] 97.5 °F (36.4 °C)  Heart Rate:  [62-74] 74  Resp:  [18] 18  BP: (102-130)/(50-80) 130/80    Flowsheet Rows      First Filed Value   Admission Height  170.2 cm (67\") Documented at 08/07/2019 1945   Admission Weight  62.6 kg (138 lb 0.1 oz) Documented at 08/07/2019 1945           No intake/output data recorded.  I/O last 3 completed shifts:  In: 480 [P.O.:480]  Out: 1600 [Urine:1600]    Intake/Output Summary (Last 24 hours) at 8/9/2019 2050  Last data filed at 8/9/2019 1500  Gross per 24 hour   Intake 240 ml   Output 800 ml   Net -560 ml       Physical Exam:  NAD; pleasant; not oriented; looks stated age  Frail  MMM; temporal wasting present; head is atraumatic   No eye discharge; no scleral icterus  +JVD; bilateral carotid bruits  Crackles in bases bilat; not labored  Irregularly irregular, not tachycardic, no rub  Soft, NT, ND, BS+  +1-2 woody edema  + clubbing  + asterixis  Moves all extremities   Mood and affect are normal  Speech is fluent    Results Review:  Results from last 7 days   Lab Units 08/09/19  0505 08/08/19  0545 08/07/19  1702   SODIUM mmol/L 140 142 141   POTASSIUM mmol/L 5.0 4.2 4.3   CHLORIDE mmol/L 98 100 100   CO2 mmol/L 33.9* 33.0* 26.6   BUN mg/dL 31* 31* 29*   CREATININE mg/dL 1.20* 1.18* 1.17*   CALCIUM mg/dL 9.6 10.2* 10.0*   GLUCOSE mg/dL 113* 95 81       Estimated Creatinine Clearance: 27.7 mL/min (A) (by C-G formula based on SCr of 1.2 mg/dL (H)).          Results from last 7 days   Lab Units 08/07/19  1702   WBC 10*3/mm3 5.32   HEMOGLOBIN g/dL 12.4   PLATELETS 10*3/mm3 93*             Active Medications    aspirin 81 mg Oral Daily   dorzolamide 1 drop Both Eyes Q12H   And    "   timolol 1 drop Both Eyes Q12H   furosemide 40 mg Intravenous Q12H   latanoprost 1 drop Both Eyes Daily   melatonin 5 mg Oral Nightly   mirtazapine 15 mg Oral Nightly   pilocarpine 1 drop Both Eyes BID   potassium chloride 20 mEq Oral Daily   sodium chloride 3 mL Intravenous Q12H          Assessment/Plan   Assessment  1.  CKD 3: Renal function is stable.  Central and peripheral volume excess.  High-normal potassium on potassium supplement.  Fairly bland urinalysis was with only 30 mg/dL protein; no cellular elements  2.  Decompensated CHF  3.  CHF  4.  Thrombocytopenia  5.  Advanced age      Atrial fibrillation (CMS/HCC)    Renal insufficiency    Acute on chronic diastolic heart failure (CMS/HCC)    CHF (congestive heart failure), NYHA class III, acute, diastolic (CMS/[facility])      Plan  1.  Agree with IV furosemide, but will increase frequency to thrice-daily dosing for now  2.  Anticipate transition to oral loop diuretic (at a dose higher than what she was taking at home) in 1-2 days  3.  Ask dietitian to review with her a low-sodium diet  4.  Discontinue potassium supplement if potassium level rises further  5.  TSH  6.  Surveillance labs    I discussed the patient's findings and my recommendations with patient    Haider Perez MD  08/09/19  8:50 PM

## 2019-08-11 PROBLEM — I42.9 CARDIOMYOPATHY (HCC): Status: ACTIVE | Noted: 2019-08-11

## 2019-08-11 LAB
ANION GAP SERPL CALCULATED.3IONS-SCNC: 11.7 MMOL/L (ref 5–15)
BUN BLD-MCNC: 39 MG/DL (ref 8–23)
BUN/CREAT SERPL: 29.8 (ref 7–25)
CALCIUM SPEC-SCNC: 9.8 MG/DL (ref 8.2–9.6)
CHLORIDE SERPL-SCNC: 97 MMOL/L (ref 98–107)
CO2 SERPL-SCNC: 30.3 MMOL/L (ref 22–29)
CREAT BLD-MCNC: 1.31 MG/DL (ref 0.57–1)
DEPRECATED RDW RBC AUTO: 50.3 FL (ref 37–54)
ERYTHROCYTE [DISTWIDTH] IN BLOOD BY AUTOMATED COUNT: 14.2 % (ref 12.3–15.4)
GFR SERPL CREATININE-BSD FRML MDRD: 38 ML/MIN/1.73
GLUCOSE BLD-MCNC: 96 MG/DL (ref 65–99)
HCT VFR BLD AUTO: 37.9 % (ref 34–46.6)
HGB BLD-MCNC: 11.4 G/DL (ref 12–15.9)
MCH RBC QN AUTO: 28.9 PG (ref 26.6–33)
MCHC RBC AUTO-ENTMCNC: 30.1 G/DL (ref 31.5–35.7)
MCV RBC AUTO: 96.2 FL (ref 79–97)
PLATELET # BLD AUTO: 83 10*3/MM3 (ref 140–450)
PMV BLD AUTO: 12.1 FL (ref 6–12)
POTASSIUM BLD-SCNC: 4.5 MMOL/L (ref 3.5–5.2)
RBC # BLD AUTO: 3.94 10*6/MM3 (ref 3.77–5.28)
SODIUM BLD-SCNC: 139 MMOL/L (ref 136–145)
WBC NRBC COR # BLD: 6.4 10*3/MM3 (ref 3.4–10.8)

## 2019-08-11 PROCEDURE — 99222 1ST HOSP IP/OBS MODERATE 55: CPT | Performed by: INTERNAL MEDICINE

## 2019-08-11 PROCEDURE — 25010000002 FUROSEMIDE PER 20 MG: Performed by: INTERNAL MEDICINE

## 2019-08-11 PROCEDURE — 85027 COMPLETE CBC AUTOMATED: CPT | Performed by: INTERNAL MEDICINE

## 2019-08-11 PROCEDURE — 99232 SBSQ HOSP IP/OBS MODERATE 35: CPT | Performed by: INTERNAL MEDICINE

## 2019-08-11 PROCEDURE — 80048 BASIC METABOLIC PNL TOTAL CA: CPT | Performed by: NURSE PRACTITIONER

## 2019-08-11 RX ORDER — FUROSEMIDE 40 MG/1
40 TABLET ORAL
Status: DISCONTINUED | OUTPATIENT
Start: 2019-08-11 | End: 2019-08-12 | Stop reason: HOSPADM

## 2019-08-11 RX ADMIN — GUAIFENESIN 600 MG: 600 TABLET, EXTENDED RELEASE ORAL at 20:59

## 2019-08-11 RX ADMIN — PILOCARPINE HYDROCHLORIDE 1 DROP: 10 SOLUTION/ DROPS OPHTHALMIC at 20:59

## 2019-08-11 RX ADMIN — FUROSEMIDE 40 MG: 40 TABLET ORAL at 19:37

## 2019-08-11 RX ADMIN — Medication 5 MG: at 20:59

## 2019-08-11 RX ADMIN — FUROSEMIDE 40 MG: 10 INJECTION, SOLUTION INTRAMUSCULAR; INTRAVENOUS at 09:12

## 2019-08-11 RX ADMIN — ASPIRIN 81 MG: 81 TABLET, CHEWABLE ORAL at 09:12

## 2019-08-11 RX ADMIN — SODIUM CHLORIDE, PRESERVATIVE FREE 3 ML: 5 INJECTION INTRAVENOUS at 09:12

## 2019-08-11 RX ADMIN — SODIUM CHLORIDE, PRESERVATIVE FREE 3 ML: 5 INJECTION INTRAVENOUS at 21:00

## 2019-08-11 RX ADMIN — MIRTAZAPINE 15 MG: 15 TABLET, FILM COATED ORAL at 20:59

## 2019-08-11 RX ADMIN — TIMOLOL MALEATE 1 DROP: 5 SOLUTION/ DROPS OPHTHALMIC at 21:00

## 2019-08-11 RX ADMIN — POTASSIUM CHLORIDE 20 MEQ: 750 CAPSULE, EXTENDED RELEASE ORAL at 09:12

## 2019-08-11 RX ADMIN — FUROSEMIDE 40 MG: 10 INJECTION, SOLUTION INTRAMUSCULAR; INTRAVENOUS at 01:36

## 2019-08-11 RX ADMIN — GUAIFENESIN 600 MG: 600 TABLET, EXTENDED RELEASE ORAL at 09:12

## 2019-08-11 NOTE — CONSULTS
Nutrition Services    Patient Name:  Christiano Bryant  YOB: 1923  MRN: [id number]  Admit Date:  8/7/2019    Consult for low sodium diet education.  Discussed this with son at bedside.  Patient was present for education as well, but not very engaged.  Son very interested and receptive.  Discussed foods to chose/avoid, tips on seasoning foods without salt, grocery shopping tips, etc.  Gave handouts for home use.    Will continue to follow.    Electronically signed by:  Natalie Mai RD  08/11/19 2:28 PM

## 2019-08-11 NOTE — CONSULTS
.     REASON FOR CONSULTATION:     Provide an opinion on any further workup or treatment of possible amyloidosis.                             REQUESTING PHYSICIAN: Frida Ellis MD     RECORDS OBTAINED:  Records of the patients history including those obtained from the referring provider were reviewed and summarized in detail.    HISTORY OF PRESENT ILLNESS:  The patient is a 89 y.o. year old female who has a history of congestive heart failure, hypertension, atrial fibrillation, TIA, chronic kidney disease stage III, and that was evaluated at the cardiologist office 8/7/2019 because of worsening short of breath, weight gains and the severe lower extremity edema.  Patient was subsequently admitted to hospital for further evaluation and management.     Patient reports her symptoms started about 2 to 3 months ago and gradually getting worse.  She had a adjustment of diuretics but was not improving with her symptomology.  She becomes more lethargic, sleeps more time and also has episodes of confusion.  She has orthopnea for about a month.  She gained about a 10 pounds in the past 3 months.  She also had decreased appetite.     The patient and her son reports her lower extremity edema has much improved after inpatient management.    This patient had echocardiogram study on 8/8/2019 which reported estimated LVEF 60% and calculated EF 80%.  The left ventricle cavity was small and there was significant left ventricular wall thickening raising the question of possible amyloidosis.  The right ventricle cavity is moderately dilated.  .        Past Medical History:   Diagnosis Date   • Acute congestive heart failure (CMS/HCC)    • Anxiety    • Atrial fibrillation (CMS/HCC)    • Chronic diastolic (congestive) heart failure (CMS/HCC)    • CKD (chronic kidney disease), stage III (CMS/HCC)    • Dyslipidemia    • Elevated brain natriuretic peptide (BNP) level    • Elevated troponin    • Generalized weakness    • Glaucoma    •  Hypertension    • Localized swelling of both lower legs    • Meningioma (CMS/HCC)    • Near syncope    • Neuropathy    • Paresthesia    • Pleural effusion due to CHF (congestive heart failure) (CMS/HCC)    • Renal insufficiency    • Short of breath on exertion    • Skin cancer    • TIA (transient ischemic attack)    • Weakness of lower extremity    • Weakness of right upper extremity      Past Surgical History:   Procedure Laterality Date   • APPENDECTOMY     • EYE SURGERY     • KNEE SURGERY Left     torn miniscus repair   • OOPHORECTOMY         MEDICATIONS    Current Facility-Administered Medications:   •  aspirin chewable tablet 81 mg, 81 mg, Oral, Daily, Trenton, Darleen A, APRN, 81 mg at 08/11/19 0912  •  docusate sodium (COLACE) capsule 100 mg, 100 mg, Oral, PRN, Trenton, Darleen A, APRN, 100 mg at 08/10/19 0204  •  dorzolamide (TRUSOPT) 2 % ophthalmic solution 1 drop, 1 drop, Both Eyes, Q12H, 1 drop at 08/10/19 1712 **AND** timolol (TIMOPTIC) 0.5 % ophthalmic solution 1 drop, 1 drop, Both Eyes, Q12H, Trenton, Darleen A, APRN, 1 drop at 08/10/19 2057  •  furosemide (LASIX) injection 40 mg, 40 mg, Intravenous, Q8H, Haider Perez MD, 40 mg at 08/11/19 0912  •  guaiFENesin (MUCINEX) 12 hr tablet 600 mg, 600 mg, Oral, Q12H, Jennifer Schulz, APRN, 600 mg at 08/11/19 0912  •  latanoprost (XALATAN) 0.005 % ophthalmic solution 1 drop, 1 drop, Both Eyes, Daily, Trenton, Darleen A, APRN  •  melatonin tablet 5 mg, 5 mg, Oral, Nightly, Trenton, Darleen A, APRN, 5 mg at 08/10/19 2057  •  mirtazapine (REMERON) tablet 15 mg, 15 mg, Oral, Nightly, Trenton, Darleen A, APRN, 15 mg at 08/10/19 2057  •  pilocarpine (PILOCAR) 1 % ophthalmic solution 1 drop, 1 drop, Both Eyes, BID, Josiah, Darleen A, APRN, 1 drop at 08/10/19 2057  •  potassium chloride (MICRO-K) CR capsule 20 mEq, 20 mEq, Oral, Daily, Darleen Rahman APRN, 20 mEq at 08/11/19 0912  •  senna (SENOKOT) tablet 8.6 mg, 1 tablet, Oral, Nightly PRN, Josiah,  Darleen A, APRN, 8.6 mg at 08/10/19 0204  •  sodium chloride 0.9 % flush 3 mL, 3 mL, Intravenous, Q12H, Jasper, Darleen A, APRN, 3 mL at 08/11/19 0912  •  sodium chloride 0.9 % flush 3-10 mL, 3-10 mL, Intravenous, PRN, Jasper, Darleen A, APRN    ALLERGIES:     Allergies   Allergen Reactions   • Penicillins Swelling       SOCIAL HISTORY:       Social History     Socioeconomic History   • Marital status:      Spouse name: Not on file   • Number of children: Not on file   • Years of education: Not on file   • Highest education level: Not on file   Tobacco Use   • Smoking status: Never Smoker   • Smokeless tobacco: Never Used   Substance and Sexual Activity   • Alcohol use: Yes     Alcohol/week: 2.4 oz     Types: 4 Shots of liquor per week     Comment: cristina 4/7 days   • Drug use: No   • Sexual activity: No         FAMILY HISTORY:  Family History   Problem Relation Age of Onset   • Angina Mother    • Cancer Father    • No Known Problems Maternal Grandmother    • No Known Problems Maternal Grandfather    • No Known Problems Paternal Grandmother    • No Known Problems Paternal Grandfather        REVIEW OF SYSTEMS:  Review of Systems   Constitutional: Positive for activity change, appetite change, fatigue and unexpected weight change. Negative for chills and fever.   HENT: Negative for facial swelling, mouth sores and sinus pressure.    Eyes: Negative for visual disturbance.   Respiratory: Positive for shortness of breath. Negative for cough.    Cardiovascular: Positive for leg swelling. Negative for chest pain and palpitations.   Gastrointestinal: Negative for abdominal pain, blood in stool, diarrhea and nausea.   Endocrine: Negative for cold intolerance.   Genitourinary: Negative for dysuria, frequency and hematuria.   Musculoskeletal: Negative for arthralgias and joint swelling.   Skin: Negative for color change and pallor.   Allergic/Immunologic: Negative for immunocompromised state.   Neurological:  Negative for dizziness, syncope, light-headedness and headaches.   Hematological: Negative for adenopathy. Does not bruise/bleed easily.   Psychiatric/Behavioral: Positive for confusion. Negative for agitation.              Vitals:    08/10/19 1923 08/11/19 0000 08/11/19 0500 08/11/19 0700   BP:  119/56  120/64   BP Location:  Right arm  Left arm   Patient Position:  Lying  Lying   Pulse:  66  72   Resp:  16  16   Temp:  98.4 °F (36.9 °C)  98.6 °F (37 °C)   TempSrc:  Oral  Oral   SpO2: 97% 97%  90%   Weight:   62.9 kg (138 lb 10.7 oz)    Height:         No flowsheet data found.   PHYSICAL EXAM:      CONSTITUTIONAL:  Vital signs reviewed. There is well-developed, reasonably nourished elderly female lying in chair. No distress, looks comfortable.  EYES:  Conjunctiva and lids unremarkable.  PERRLA  EARS,NOSE,MOUTH,THROAT:  Nose appear unremarkable.  Lips appear unremarkable.  RESPIRATORY:  Normal respiratory effort.  Lungs clear to auscultation bilaterally.  CARDIOVASCULAR: Irregular rhythm, rate controlled.  Normal S1, S2.  No murmurs.  No significant lower extremity edema.  GASTROINTESTINAL: Abdomen appears unremarkable.  Nontender.  No hepatomegaly.  No splenomegaly.  Bowel sounds normal.  LYMPHATIC:  No cervical, supraclavicular lymphadenopathy.  MUSCULOSKELETAL:  Unremarkable digits/nails.  No cyanosis or clubbing.  SKIN:  Warm.  No rashes.  PSYCHIATRIC:  Normal judgment and insight.  Normal mood and affect.      RECENT LABS:        WBC   Date Value Ref Range Status   08/11/2019 6.40 3.40 - 10.80 10*3/mm3 Final     Hemoglobin   Date Value Ref Range Status   08/11/2019 11.4 (L) 12.0 - 15.9 g/dL Final     Platelets   Date Value Ref Range Status   08/11/2019 83 (L) 140 - 450 10*3/mm3 Final     Results from last 7 days   Lab Units 08/11/19  0624 08/10/19  0616 08/09/19  0505   SODIUM mmol/L 139 136 140   POTASSIUM mmol/L 4.5 4.5 5.0   CHLORIDE mmol/L 97* 94* 98   CO2 mmol/L 30.3* 32.1* 33.9*   BUN mg/dL 39* 32* 31*    CREATININE mg/dL 1.31* 1.18* 1.20*   CALCIUM mg/dL 9.8* 9.7* 9.6   GLUCOSE mg/dL 96 107* 113*       IMAGE STUDY:  1.  Echocardiogram study on 8/8/2019  Interpretation Summary     · Left ventricular systolic function is normal. Calculated EF = 80.0%. Estimated EF was in agreement with the calculated EF. Estimated EF = 60%. All left ventricular wall segments contract normally.  · The left ventricular cavity is small. Left ventricular wall thickness is consistent with severe concentric hypertrophy. 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 There is an echo bright appearance to the left and right ventricular myocardium suspicious for amyloid heart disease. . Left ventricular diastolic function was indeterminate. Elevated left atrial pressure.  · Right ventricular cavity is moderately dilated. Right ventricular wall thickness is consistent with moderate hypertrophy. Moderately reduced right ventricular systolic function noted.  · There is moderate thickening of the aortic valve. No aortic valve regurgitation is present. No aortic valve stenosis is present.  · Moderate MAC is present. Mild mitral valve regurgitation is present.  · Mild tricuspid valve regurgitation is present. Estimated right ventricular systolic pressure from tricuspid regurgitation is mildly elevated (35-45 mmHg).                Assessment/Plan   Patient is 89 years old female with a history of congestive heart failure, chronic renal insufficiency, hypertension, who has recent worsening lower extremity edema and weight gains.  Patient has improved leg edema significantly after IV diuretics.    She had echocardiogram study on 8/8/2019 reported a small left ventricular size, and a significant thickening of the left ventricle wall, infiltrative cardiomyopathy, raise the suspicion of cardiac amyloidosis.    I discussed with the patient and her son at the bedside, explained to them what the cardiac amyloidosis means, and its  relationship with plasma cell dyscrasia and the proper way of evaluation with both tissue biopsy and also laboratory studies.  I also discussed with him the treatment for cardiac amyloidosis if it is related to plasma cell dyscrasia/light chain disorder.    I pointed out to to patient and understand that even she is confirmed as having light chain amyloidosis, she would not be a candidate for chemotherapy.  Even patient is candidate for chemotherapy and typically do not have good results.    With all things considered, I recommended against for further work-up in this direction.    PLAN:  1.  I have recommended against laboratory study and biopsy for evaluation of amyloidosis.  This patient is not a candidate for any chemotherapy even confirmed to be amyloidosis.   2.  I recommended conservative management for her cardiac disorder.      I discussed with the patient and her son today at the bedside.  They voiced understanding.    I spoke with her nurse also about my opinion.      Thanks for letting us participating in the care of this patient!    Will sign off.       KAUSHIK ESPINOZA M.D., Ph.D.    8/11/2019      Dictated using Dragon Dictation.

## 2019-08-11 NOTE — PROGRESS NOTES
[facility]  Progress note: 2019    Patient Identification:  Name:Christiano Bryant  Age:89 y.o.  Sex: female  :  3/7/1923  MRN: [id number]           CC:  Congestive heart failure, atrial fibrillation    Interval history:  Appreciate oncology consult.  Patient not felt to be candidate for chemotherapy even with confirmed amyloidosis and therefore would agree with no further testing.  Dyspnea has improved though still quite weak ambulating.  She is very anxious to go home    Vital Signs:   Temp:  [98.4 °F (36.9 °C)-98.9 °F (37.2 °C)] 98.9 °F (37.2 °C)  Heart Rate:  [60-72] 60  Resp:  [16] 16  BP: (117-154)/(56-72) 117/69    Intake/Output Summary (Last 24 hours) at 2019 1407  Last data filed at 2019 0130  Gross per 24 hour   Intake --   Output 600 ml   Net -600 ml       Physical Examination:    General Appearance No acute distress   Neck No adenopathy, supple, trachea midline, no thyromegaly, no carotid bruit, no JVD   Lungs  few rales right base,respirations regular, even and unlabored   Heart Regular rhythm and normal rate, normal S1 and S2, no murmur, no gallop, no rub, no click   Chest wall No abnormalities observed   Abdomen Normal bowel sounds, no masses, no hepatomegaly, soft   Extremities Moves all extremities well, trace edema, no cyanosis, no redness   Neurological Alert and oriented x 3     Lab Review:  Personally reviewed the labs, radiology imaging and other cardiac procedures. Results from last 7 days   Lab Units 19  0624   SODIUM mmol/L 139   POTASSIUM mmol/L 4.5   CHLORIDE mmol/L 97*   CO2 mmol/L 30.3*   BUN mg/dL 39*   CREATININE mg/dL 1.31*   CALCIUM mg/dL 9.8*   GLUCOSE mg/dL 96       Results from last 7 days   Lab Units 19  0624 19  1702   WBC 10*3/mm3 6.40 5.32   HEMOGLOBIN g/dL 11.4* 12.4   HEMATOCRIT % 37.9 41.4   PLATELETS 10*3/mm3 83* 93*       Medication Review:   Meds reviewed  Scheduled Meds:  aspirin 81 mg Oral Daily   dorzolamide 1 drop Both Eyes  Q12H   And      timolol 1 drop Both Eyes Q12H   furosemide 40 mg Intravenous Q8H   guaiFENesin 600 mg Oral Q12H   latanoprost 1 drop Both Eyes Daily   melatonin 5 mg Oral Nightly   mirtazapine 15 mg Oral Nightly   pilocarpine 1 drop Both Eyes BID   potassium chloride 20 mEq Oral Daily   sodium chloride 3 mL Intravenous Q12H     I personally viewed and interpreted the patient's EKG/Telemetry data    Assessment and Plan  1.  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  As above will defer testing for amyloidosis.  Clinically improved.  Will switch to oral Lasix 80 mg p.o. twice daily unless otherwise further adjusted by nephrology  2.  Persistent atrial fibrillation, rates controlled.  She is a poor candidate for anticoagulation  3.  Renal insufficiency with proteinuria creatinine slightly worse.  Additional recommendations per nephrology  4.  Hypertension, controlled  5.  Thrombocytopenia, slightly worse today.  Recheck in a.m.    Frida Ellis  8/11/20192:07 PM  25min spent in reviewing records, discussion and examination of the patient and discussion with other members of the patient's medical team.     Dictated utilizing Dragon dictation

## 2019-08-11 NOTE — PROGRESS NOTES
"   LOS: 4 days    Patient Care Team:  Randall Mccoy MD as PCP - General (Family Medicine)  Randall Mccoy MD as PCP - Claims Attributed    Chief Complaint:  No chief complaint on file.      Subjective     Interval History:   Patient is breathing better with less shortness of breath.  Swelling has gone down.  She feels better than she did when she came to the hospital.    Review of Systems:    As above    Objective     Vital Signs  Temp:  [98.4 °F (36.9 °C)-98.9 °F (37.2 °C)] 98.9 °F (37.2 °C)  Heart Rate:  [60-72] 60  Resp:  [16] 16  BP: (117-154)/(56-72) 117/69    Flowsheet Rows      First Filed Value   Admission Height  170.2 cm (67\") Documented at 08/07/2019 1945   Admission Weight  62.6 kg (138 lb 0.1 oz) Documented at 08/07/2019 1945          No intake/output data recorded.  I/O last 3 completed shifts:  In: -   Out: 1400 [Urine:1400]    Intake/Output Summary (Last 24 hours) at 8/11/2019 1535  Last data filed at 8/11/2019 0130  Gross per 24 hour   Intake --   Output 600 ml   Net -600 ml       Physical Exam:  General Appearance: alert, oriented x 3, no acute distress,   Skin: warm and dry  HEENT: pupils round and reactive to light, oral mucosa normal,   Neck: supple, no JVD, trachea midline  Lungs: Bilateral crackles in both bases no rhonchi or wheezes  Heart: RRR, normal S1 and S2, no S3, no rub  Abdomen: soft, non-tender, no palpable bladder, present bowel sounds to auscultation  Extremities: 2+ edema with no cyanosis or clubbing  Neuro: normal speech and mental status          Results Review:    Results from last 7 days   Lab Units 08/11/19  0624 08/10/19  0616 08/09/19  0505   SODIUM mmol/L 139 136 140   POTASSIUM mmol/L 4.5 4.5 5.0   CHLORIDE mmol/L 97* 94* 98   CO2 mmol/L 30.3* 32.1* 33.9*   BUN mg/dL 39* 32* 31*   CREATININE mg/dL 1.31* 1.18* 1.20*   CALCIUM mg/dL 9.8* 9.7* 9.6   GLUCOSE mg/dL 96 107* 113*       Estimated Creatinine Clearance: 24.9 mL/min (A) (by C-G formula based on SCr of 1.31 mg/dL " (H)).    Results from last 7 days   Lab Units 08/10/19  0616   MAGNESIUM mg/dL 2.4*             Results from last 7 days   Lab Units 08/11/19  0624 08/07/19  1702   WBC 10*3/mm3 6.40 5.32   HEMOGLOBIN g/dL 11.4* 12.4   PLATELETS 10*3/mm3 83* 93*               Imaging Results (last 24 hours)     ** No results found for the last 24 hours. **          aspirin 81 mg Oral Daily   dorzolamide 1 drop Both Eyes Q12H   And      timolol 1 drop Both Eyes Q12H   furosemide 40 mg Intravenous Q8H   guaiFENesin 600 mg Oral Q12H   latanoprost 1 drop Both Eyes Daily   melatonin 5 mg Oral Nightly   mirtazapine 15 mg Oral Nightly   pilocarpine 1 drop Both Eyes BID   potassium chloride 20 mEq Oral Daily   sodium chloride 3 mL Intravenous Q12H          Medication Review:   Current Facility-Administered Medications   Medication Dose Route Frequency Provider Last Rate Last Dose   • aspirin chewable tablet 81 mg  81 mg Oral Daily Darleen Rahman APRN   81 mg at 08/11/19 0912   • docusate sodium (COLACE) capsule 100 mg  100 mg Oral PRN Darleen Rahman APRN   100 mg at 08/10/19 0204   • dorzolamide (TRUSOPT) 2 % ophthalmic solution 1 drop  1 drop Both Eyes Q12H Darleen Rahman APRN   1 drop at 08/10/19 1712    And   • timolol (TIMOPTIC) 0.5 % ophthalmic solution 1 drop  1 drop Both Eyes Q12H Darleen Rahman APRN   1 drop at 08/10/19 2057   • furosemide (LASIX) injection 40 mg  40 mg Intravenous Q8H Haider Perez MD   40 mg at 08/11/19 0912   • guaiFENesin (MUCINEX) 12 hr tablet 600 mg  600 mg Oral Q12H Jennifer Schulz APRN   600 mg at 08/11/19 0912   • latanoprost (XALATAN) 0.005 % ophthalmic solution 1 drop  1 drop Both Eyes Daily Darleen Rahman APRN       • melatonin tablet 5 mg  5 mg Oral Nightly Darleen Rahman APRN   5 mg at 08/10/19 2057   • mirtazapine (REMERON) tablet 15 mg  15 mg Oral Nightly Darleen Rahman APRN   15 mg at 08/10/19 2057   • pilocarpine (PILOCAR) 1 % ophthalmic solution 1 drop  1  drop Both Eyes BID Darleen Rahman APRN   1 drop at 08/10/19 2057   • potassium chloride (MICRO-K) CR capsule 20 mEq  20 mEq Oral Daily Darleen Rahman APRN   20 mEq at 08/11/19 0912   • senna (SENOKOT) tablet 8.6 mg  1 tablet Oral Nightly PRN [place] crump APRN   8.6 mg at 08/10/19 0204   • sodium chloride 0.9 % flush 3 mL  3 mL Intravenous Q12H Darleen Rahman APRN   3 mL at 08/11/19 0912   • sodium chloride 0.9 % flush 3-10 mL  3-10 mL Intravenous PRN Darleen Rahman, APRN           Assessment/Plan   Impression:   Chronic kidney disease stage III  CHF  Thrombocytopenia    Plan:  We will switch over to p.o. diuretics for now due to rising BUN and creatinine.  Patient is good urine output on IV diuretics.  We will monitor potassium.  Restrict salt water intake.  TSH level is within normal limits  Will monitor fluid electrolyte status.  We will follow    Derik Silva MD  08/11/19  3:35 PM

## 2019-08-12 VITALS
WEIGHT: 141.31 LBS | OXYGEN SATURATION: 93 % | HEIGHT: 67 IN | DIASTOLIC BLOOD PRESSURE: 54 MMHG | TEMPERATURE: 97.6 F | RESPIRATION RATE: 20 BRPM | BODY MASS INDEX: 22.18 KG/M2 | SYSTOLIC BLOOD PRESSURE: 114 MMHG | HEART RATE: 73 BPM

## 2019-08-12 LAB
ANION GAP SERPL CALCULATED.3IONS-SCNC: 9.9 MMOL/L (ref 5–15)
BUN BLD-MCNC: 39 MG/DL (ref 8–23)
BUN/CREAT SERPL: 33.9 (ref 7–25)
CALCIUM SPEC-SCNC: 9.9 MG/DL (ref 8.2–9.6)
CHLORIDE SERPL-SCNC: 97 MMOL/L (ref 98–107)
CO2 SERPL-SCNC: 33.1 MMOL/L (ref 22–29)
CREAT BLD-MCNC: 1.15 MG/DL (ref 0.57–1)
DEPRECATED RDW RBC AUTO: 52.2 FL (ref 37–54)
ERYTHROCYTE [DISTWIDTH] IN BLOOD BY AUTOMATED COUNT: 14.2 % (ref 12.3–15.4)
GFR SERPL CREATININE-BSD FRML MDRD: 44 ML/MIN/1.73
GLUCOSE BLD-MCNC: 94 MG/DL (ref 65–99)
HCT VFR BLD AUTO: 39.6 % (ref 34–46.6)
HGB BLD-MCNC: 11.4 G/DL (ref 12–15.9)
MCH RBC QN AUTO: 28.3 PG (ref 26.6–33)
MCHC RBC AUTO-ENTMCNC: 28.8 G/DL (ref 31.5–35.7)
MCV RBC AUTO: 98.3 FL (ref 79–97)
PLATELET # BLD AUTO: 80 10*3/MM3 (ref 140–450)
PMV BLD AUTO: 11.6 FL (ref 6–12)
POTASSIUM BLD-SCNC: 4.4 MMOL/L (ref 3.5–5.2)
RBC # BLD AUTO: 4.03 10*6/MM3 (ref 3.77–5.28)
SODIUM BLD-SCNC: 140 MMOL/L (ref 136–145)
WBC NRBC COR # BLD: 5.68 10*3/MM3 (ref 3.4–10.8)

## 2019-08-12 PROCEDURE — 80048 BASIC METABOLIC PNL TOTAL CA: CPT | Performed by: INTERNAL MEDICINE

## 2019-08-12 PROCEDURE — 85027 COMPLETE CBC AUTOMATED: CPT | Performed by: INTERNAL MEDICINE

## 2019-08-12 PROCEDURE — 99238 HOSP IP/OBS DSCHRG MGMT 30/<: CPT | Performed by: NURSE PRACTITIONER

## 2019-08-12 RX ADMIN — ASPIRIN 81 MG: 81 TABLET, CHEWABLE ORAL at 09:11

## 2019-08-12 RX ADMIN — POTASSIUM CHLORIDE 20 MEQ: 750 CAPSULE, EXTENDED RELEASE ORAL at 09:11

## 2019-08-12 RX ADMIN — LATANOPROST 1 DROP: 50 SOLUTION OPHTHALMIC at 09:14

## 2019-08-12 RX ADMIN — FUROSEMIDE 40 MG: 40 TABLET ORAL at 09:11

## 2019-08-12 RX ADMIN — DORZOLAMIDE HYDROCHLORIDE 1 DROP: 20 SOLUTION/ DROPS OPHTHALMIC at 06:20

## 2019-08-12 RX ADMIN — SODIUM CHLORIDE, PRESERVATIVE FREE 3 ML: 5 INJECTION INTRAVENOUS at 09:14

## 2019-08-12 RX ADMIN — TIMOLOL MALEATE 1 DROP: 5 SOLUTION/ DROPS OPHTHALMIC at 09:11

## 2019-08-12 RX ADMIN — PILOCARPINE HYDROCHLORIDE 1 DROP: 10 SOLUTION/ DROPS OPHTHALMIC at 09:11

## 2019-08-12 RX ADMIN — GUAIFENESIN 600 MG: 600 TABLET, EXTENDED RELEASE ORAL at 09:11

## 2019-08-12 NOTE — PLAN OF CARE
Problem: Patient Care Overview  Goal: Plan of Care Review  Outcome: Ongoing (interventions implemented as appropriate)   08/12/19 0523   Coping/Psychosocial   Plan of Care Reviewed With patient   Plan of Care Review   Progress no change   OTHER   Outcome Summary Cont on O2 2 L per n/c.Pt is assist x1 to bedside commode. Pt denies any pain or discomfort.Will continue to monitor.       Problem: Fall Risk (Adult)  Goal: Identify Related Risk Factors and Signs and Symptoms  Outcome: Outcome(s) achieved Date Met: 08/12/19      Problem: Skin Injury Risk (Adult)  Goal: Identify Related Risk Factors and Signs and Symptoms  Outcome: Ongoing (interventions implemented as appropriate)      Problem: Cardiac: Heart Failure (Adult)  Goal: Signs and Symptoms of Listed Potential Problems Will be Absent, Minimized or Managed (Cardiac: Heart Failure)  Outcome: Ongoing (interventions implemented as appropriate)      Problem: ARDS (Acute Resp Distress Syndrome) (Adult)  Goal: Signs and Symptoms of Listed Potential Problems Will be Absent, Minimized or Managed (ARDS)  Outcome: Ongoing (interventions implemented as appropriate)

## 2019-08-12 NOTE — DISCHARGE SUMMARY
Hospital Discharge    Patient Name: Christiano Bryant  Age/Sex: 89 y.o. female  : 3/7/1923  MRN: [id number]    Encounter Provider: KVNG Brown  Referring Provider: Frida Ellis MD  Place of Service: [facility] CARDIOLOGY  Patient Care Team:  Randall Mccoy MD as PCP - General (Family Medicine)  Randall Mccoy MD as PCP - Claims Attributed         Date of Discharge:  2019   Date of Admit: 2019    Discharge Condition: Stable  Discharge Diagnosis:    Atrial fibrillation (CMS/HCC)    Renal insufficiency    Acute on chronic diastolic heart failure (CMS/HCC)    CHF (congestive heart failure), NYHA class III, acute, diastolic (CMS/HCC)    Cardiomyopathy (CMS/HCC)      Hospital Course:   Christiano Bryant is a 89 y.o. female who called the office last week with reports of shortness of breath and edema. She was seen by KVNG Colorado in the office and directly admitted for IV diuretics and further evaluation. CXR showed only small pleural effusions. 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 Nephrology was consulted and assisted with diuretic management. She was also seen by Dr. Khanna who recommended against further evaluation of amyloidosis as she is not a candidate for chemotherapy even if diagnosis was confirmed. On  Dr. Silva changed patient to oral diuretic (same dose as home). It was mentioned earlier in the admission that she may need higher home dose. We will discharge her on 40 mgBID with close outpatient follow up to see if dosing needs to be adjusted. Patient is now on room air. She is doing well. She is stable for discharge. She does live independently, however daughter reports they will be arranging for more help.  She will follow up in our office in one week.     I was called after patient was discharged. She started to drop saturations while talking with daughter. We performed a walking oximetry and she dropped saturations to 85% on room  air. We will arrange for home health and home oxygen. Patient will require 24 hour assistance. This has been discussed with family and they are prepared for discharge.      Objective:  Temp:  [97.6 °F (36.4 °C)-98.4 °F (36.9 °C)] 97.6 °F (36.4 °C)  Heart Rate:  [63-73] 73  Resp:  [16-20] 20  BP: (114-130)/(53-62) 114/54    Intake/Output Summary (Last 24 hours) at 8/12/2019 1456  Last data filed at 8/12/2019 0940  Gross per 24 hour   Intake 240 ml   Output 700 ml   Net -460 ml     Body mass index is 22.13 kg/m².      08/10/19  0636 08/11/19  0500 08/12/19  0500   Weight: 63.5 kg (139 lb 15.9 oz) 62.9 kg (138 lb 10.7 oz) 64.1 kg (141 lb 5 oz)     Weight change: 1.2 kg (2 lb 10.3 oz)    Physical Exam:  Constitutional: She is oriented to person, place, and time. She appears well-developed. She does not appear ill.   Neck: No JVD present.   Cardiovascular: Normal rate, regular rhythm and normal heart sounds.    Pulses:       Posterior tibial pulses are 2+ on the right side, and 2+ on the left side.   Pulmonary/Chest: Effort normal and breath sounds normal.   Abdominal: Soft. Normal appearance and bowel sounds are normal. There is no tenderness.   Musculoskeletal: Normal range of motion.        Right shoulder: She exhibits no deformity.        Left shoulder: She exhibits no deformity.   Neurological: She is alert and oriented to person, place, and time. She has normal strength.   Skin: Skin is warm, dry and intact. No rash noted.   Psychiatric: She has a normal mood and affect. Her behavior is normal. Thought content normal.   Vitals reviewed      Procedures Performed  Echo 8/8/19  · Left ventricular systolic function is normal. Calculated EF = 80.0%. Estimated EF was in agreement with the calculated EF. Estimated EF = 60%. All left ventricular wall segments contract normally.  · The left ventricular cavity is small. Left ventricular wall thickness is consistent with severe concentric hypertrophy. The findings are  consistent with infiltrative cardiomyopathy. There is an echo bright appearance to the left and right ventricular myocardium suspicious for amyloid heart disease. . Left ventricular diastolic function was indeterminate. Elevated left atrial pressure.  · Right ventricular cavity is moderately dilated. Right ventricular wall thickness is consistent with moderate hypertrophy. Moderately reduced right ventricular systolic function noted.  · There is moderate thickening of the aortic valve. No aortic valve regurgitation is present. No aortic valve stenosis is present.  · Moderate MAC is present. Mild mitral valve regurgitation is present.  · Mild tricuspid valve regurgitation is present. Estimated right ventricular systolic pressure from tricuspid regurgitation is mildly elevated (35-45 mmHg).       Consults:  Consults     Date and Time Order Name Status Description    8/10/2019 2121 Hematology & Oncology Inpatient Consult Completed     8/8/2019 1647 Inpatient Nephrology Consult            Pertinent Test Results:  Results from last 7 days   Lab Units 08/12/19  0630 08/11/19  0624 08/10/19  0616 08/09/19  0505 08/08/19  0545 08/07/19  1702 08/07/19  1237   SODIUM mmol/L 140 139 136 140 142 141 141   POTASSIUM mmol/L 4.4 4.5 4.5 5.0 4.2 4.3 4.3   CHLORIDE mmol/L 97* 97* 94* 98 100 100 99   CO2 mmol/L 33.1* 30.3* 32.1* 33.9* 33.0* 26.6 33.6*   BUN mg/dL 39* 39* 32* 31* 31* 29* 32*   CREATININE mg/dL 1.15* 1.31* 1.18* 1.20* 1.18* 1.17* 1.20*   GLUCOSE mg/dL 94 96 107* 113* 95 81 84   CALCIUM mg/dL 9.9* 9.8* 9.7* 9.6 10.2* 10.0* 10.1*         Results from last 7 days   Lab Units 08/12/19  0630 08/11/19  0624 08/07/19  1702   WBC 10*3/mm3 5.68 6.40 5.32   HEMOGLOBIN g/dL 11.4* 11.4* 12.4   HEMATOCRIT % 39.6 37.9 41.4   PLATELETS 10*3/mm3 80* 83* 93*         Results from last 7 days   Lab Units 08/10/19  0616   MAGNESIUM mg/dL 2.4*           Invalid input(s): LDLCALC  Results from last 7 days   Lab Units 08/10/19  0636  08/07/19  1702   PROBNP pg/mL 12,563.0* 10,516.0*     Results from last 7 days   Lab Units 08/10/19  0616   TSH mIU/mL 1.520       Discharge Medications     Discharge Medications      Continue These Medications      Instructions Start Date   aspirin 81 MG chewable tablet   81 mg, Oral, Daily      BELSOMRA 10 MG tablet  Generic drug:  Suvorexant   TAKE 1 TABLET BY MOUTH EVERY EVENING      docusate sodium 100 MG capsule  Commonly known as:  COLACE   100 mg, Oral, As Needed      furosemide 40 MG tablet  Commonly known as:  LASIX   TAKE 1 TABLET BY MOUTH TWICE DAILY      guaiFENesin 600 MG 12 hr tablet  Commonly known as:  MUCINEX   600 mg, Oral, 2 Times Daily PRN      melatonin 5 MG tablet tablet   5 mg, Oral, Nightly      mirtazapine 15 MG tablet  Commonly known as:  REMERON   TAKE 1 TABLET BY MOUTH EVERY NIGHT AT BEDTIME      pilocarpine 1 % ophthalmic solution  Commonly known as:  PILOCAR   Both Eyes, 2 Times Daily      potassium chloride 10 MEQ CR tablet  Commonly known as:  K-DUR   10 mEq, Oral, Daily      senna 8.6 MG tablet tablet  Commonly known as:  SENOKOT   1 tablet, Oral, Nightly PRN         Stop These Medications    dorzolamide-timolol 22.3-6.8 MG/ML ophthalmic solution  Commonly known as:  COSOPT     latanoprost 0.005 % ophthalmic solution  Commonly known as:  XALATAN            Discharge Diet:    Dietary Orders (From admission, onward)    Start     Ordered    08/07/19 1638  Diet Regular; Cardiac  Diet Effective Now     Question Answer Comment   Diet Texture / Consistency Regular    Common Modifiers Cardiac        08/07/19 1640          Activity at Discharge:    Activity Instructions     Gradually Increase Activity Until at Pre-Hospitalization Level             Discharge disposition: home     Discharge Instructions and Follow ups:  No future appointments.  Additional Instructions for the Follow-ups that You Need to Schedule     Discharge Follow-up with Specified Provider: KVNG Santiago; 1 Week   As  directed      To:  KVNG Santiago    Follow Up:  1 Week           Follow-up Information     Randall Mccoy MD .    Specialties:  Family Medicine, Emergency Medicine, Internal Medicine  Contact information:  [address] [id number] [phone]                   Test Results Pending at Discharge:  None     KVNG Brown  08/12/19  2:56 PM

## 2019-08-12 NOTE — PROGRESS NOTES
Continued Stay Note  [facility]     Patient Name: Christiano Bryant  MRN: [id number]  Today's Date: 8/12/2019    Admit Date: 8/7/2019    Discharge Plan     Row Name 08/12/19 1533       Plan    Plan  Home with children providing 24/7 care and [facility]- follow for home o2 setup    Patient/Family in Agreement with Plan  yes    Plan Comments  Spoke with pt, daughter Meghana and son Lyle at bedside, introduced self and explained CCP role. Lyle states plan is home with 24/7 care thru him and his sister Meghana with addition of [facility]. CCP did explain 30 day window for SNF, explained HH vs SNF and provided [facility] resources. CCP reviewed DME providers if pt needs o2. Family will review. Pt still requiring oxygen, updated RN for oximetry test. and MD order for home o2 setup. Family to transport home. Spoke with Sophie/[facility] [place] today.Calin BERMUDEZ/CCP        Discharge Codes    No documentation.       Expected Discharge Date and Time     Expected Discharge Date Expected Discharge Time    Aug 12, 2019             Hannah Cuello, AIDAN

## 2019-08-13 ENCOUNTER — READMISSION MANAGEMENT (OUTPATIENT)
Dept: CALL CENTER | Facility: HOSPITAL | Age: 84
End: 2019-08-13

## 2019-08-14 ENCOUNTER — EPISODE CHANGES (OUTPATIENT)
Dept: CASE MANAGEMENT | Facility: OTHER | Age: 84
End: 2019-08-14

## 2019-08-14 ENCOUNTER — READMISSION MANAGEMENT (OUTPATIENT)
Dept: CALL CENTER | Facility: HOSPITAL | Age: 84
End: 2019-08-14

## 2019-08-14 NOTE — OUTREACH NOTE
CHF Week 1 Survey      Responses   Facility patient discharged from?  [place]   Does the patient have one of the following disease processes/diagnoses(primary or secondary)?  CHF   Is there a successful TCM telephone encounter documented?  No   CHF Week 1 attempt successful?  Yes   Call start time  1002   Call end time  1013   Discharge diagnosis   CHF,  A-fib   Is patient permission given to speak with other caregiver?  Yes   List who call center can speak with  dtr   Person spoke with today (if not patient) and relationship  dtr   Meds reviewed with patient/caregiver?  Yes   Is the patient having any side effects they believe may be caused by any medication additions or changes?  No   Does the patient have all medications ordered at discharge?  N/A   Is the patient taking all medications as directed (includes completed medication regime)?  Yes   Does the patient have a primary care provider?   Yes   Does the patient have an appointment with their PCP within 7 days of discharge?  Yes   Has the patient kept scheduled appointments due by today?  N/A   What is the Home health agency?   [facility]   Has home health visited the patient within 72 hours of discharge?  Yes   What DME was ordered?  [facility]   DME comments  Home O2 in place now.    Psychosocial issues?  No   Comments  SOA is better with the home O2 in place. LE edema is slowly resolving. Dtr and son are trying to do 24/7 care, they may consider rehab facility to help pt recover.    Did the patient receive a copy of their discharge instructions?  Yes   Nursing interventions  Reviewed instructions with patient   What is the patient's perception of their health status since discharge?  Improving   Nursing interventions  Nurse provided patient education   Is the patient weighing daily?  Yes   Does the patient have scales?  Yes   Daily weight interventions  Education provided on importance of daily weight   Is the patient able to teach back Heart Failure diet  management?  Yes   Is the patient able to teach back Heart Failure Zones?  Yes   Is the patient able to teach back signs and symptoms of worsening condition? (i.e. weight gain, shortness of air, etc.)  Yes   Is the patient/caregiver able to teach back the hierarchy of who to call/visit for symptoms/problems? PCP, Specialist, Home health nurse, Urgent Care, ED, 911  Yes    CHF Week 1 call completed?  Yes          Cyn Lopez RN

## 2019-08-14 NOTE — PROGRESS NOTES
Case Management Discharge Note    Final Note: Home with [facility] and oxygen thru [place]. Family transported. ihsan lloyd/ccp    Destination      No service has been selected for the patient.      Durable Medical Equipment      Service Provider Request Status Selected Services Address Phone Number Fax Number    [facility] Selected Durable Medical Equipment [address], [facility] [id number] [phone]      Dialysis/Infusion      No service has been selected for the patient.      Home Medical Care      Service Provider Request Status Selected Services Address Phone Number Fax Number    [facility] [place] [facility] Health Services [address], [address], [place] [address] [phone]      Therapy      No service has been selected for the patient.      Community Resources      No service has been selected for the patient.        Transportation Services  Private: Car    Final Discharge Disposition Code: 06 - home with home health care

## 2019-08-14 NOTE — OUTREACH NOTE
Prep Survey      Responses   Facility patient discharged from?  [place]   Is patient eligible?  Yes   Discharge diagnosis   CHF,  A-fib   Does the patient have one of the following disease processes/diagnoses(primary or secondary)?  CHF   Does the patient have Home health ordered?  Yes   What is the Home health agency?   [facility]   Is there a DME ordered?  Yes   What DME was ordered?  [facility]   Prep survey completed?  Yes          Tori Calixto RN

## 2019-08-21 ENCOUNTER — READMISSION MANAGEMENT (OUTPATIENT)
Dept: CALL CENTER | Facility: HOSPITAL | Age: 84
End: 2019-08-21

## 2019-08-21 ENCOUNTER — OFFICE VISIT (OUTPATIENT)
Dept: CARDIOLOGY | Facility: CLINIC | Age: 84
End: 2019-08-21

## 2019-08-21 VITALS
WEIGHT: 129.6 LBS | RESPIRATION RATE: 16 BRPM | HEART RATE: 58 BPM | BODY MASS INDEX: 20.34 KG/M2 | DIASTOLIC BLOOD PRESSURE: 88 MMHG | HEIGHT: 67 IN | SYSTOLIC BLOOD PRESSURE: 130 MMHG

## 2019-08-21 DIAGNOSIS — Z86.73 HISTORY OF TRANSIENT ISCHEMIC ATTACK (TIA): ICD-10-CM

## 2019-08-21 DIAGNOSIS — N18.30 STAGE III CHRONIC KIDNEY DISEASE (HCC): ICD-10-CM

## 2019-08-21 DIAGNOSIS — I48.19 PERSISTENT ATRIAL FIBRILLATION (HCC): ICD-10-CM

## 2019-08-21 DIAGNOSIS — I10 ESSENTIAL HYPERTENSION: ICD-10-CM

## 2019-08-21 DIAGNOSIS — I50.32 CHRONIC DIASTOLIC CONGESTIVE HEART FAILURE (HCC): Primary | ICD-10-CM

## 2019-08-21 PROCEDURE — 93000 ELECTROCARDIOGRAM COMPLETE: CPT | Performed by: NURSE PRACTITIONER

## 2019-08-21 PROCEDURE — 99214 OFFICE O/P EST MOD 30 MIN: CPT | Performed by: NURSE PRACTITIONER

## 2019-08-21 RX ORDER — FUROSEMIDE 40 MG/1
120 TABLET ORAL 3 TIMES DAILY
COMMUNITY
End: 2019-12-13 | Stop reason: SDUPTHER

## 2019-08-21 NOTE — OUTREACH NOTE
CHF Week 2 Survey      Responses   Facility patient discharged from?  [facility]   Does the patient have one of the following disease processes/diagnoses(primary or secondary)?  CHF   Week 2 attempt successful?  Yes   Call start time  1235   Rescheduled  Rescheduled-pt requested [At cardiology office, call rescheduled.]   Discharge diagnosis   CHF,  A-fib          Selma Lynn RN

## 2019-08-21 NOTE — PROGRESS NOTES
Date of Office Visit: 2019  Encounter Provider: KVNG Moses  Place of Service: [facility] CARDIOLOGY  Patient Name: Christiano Bryant  :3/7/1923    Chief Complaint   Patient presents with   • Atrial Fibrillation   • Congestive Heart Failure   • Follow-up   :     HPI: Christiano Bryant is a 89 y.o. female  with history of atrial fibrillation, congestive heart failure, amyloidosis, renal insufficiency, hypertension, TIA, and thrombocytopenia.  She is followed by Dr. Ellis. I will be seeing her for the first time today and have reviewed her medical record.    In  she had a TIA in the setting of new onset atrial fibrillation.  Echocardiogram normal without significant valvular disease.  She was started on Eliquis and subsequently switched to Coumadin.  In 2018 she had some congestive heart failure and her diuretics were increased but complicated by renal insufficiency.  In 2019 she was admitted at [facility] with generalized weakness, weight loss.  CT scan of the abdomen showed a pelvic mass and bilateral pleural effusions.  She underwent thoracentesis.  She was felt not to be a candidate for further surgical intervention or chemotherapy.  The effusion was felt to be transudate of.  An echocardiogram showed severe left ventricular hypertrophy with an ejection fraction of 69%, dilated right ventricle with right ventricular systolic dysfunction and mitral annular calcification without stenosis or regurgitation.  She followed up in 2019 and was noted to have persistent atrial fibrillation with slightly low heart rates and her carvedilol was discontinued.  Her blood pressure was also low at that time.  In 2019 she reported 3 separate falls one falling backward and 2 at home when she tripped and fell.  In 2019 warfarin was discontinued secondary to falls.      She was hospitalized in 2019 from our office with congestive heart failure.  She  had gained 8-10 pounds since April and had more shortness of breath and increased dyspnea on exertion.  She had an echocardiogram on 8/8/2019 which reported a small left ventricular size and significant thickening of the left ventricular wall for questionable infiltrative cardiomyopathy.  She was evaluated by hematology for suspicion of amyloid was discussed but the patient would not be a candidate for any chemotherapy even if laboratory biopsy studies confirmed amyloidosis so conservative management was recommended and no biopsy was taken per patient and family wishes.  She was treated with IV diuretic and transition to oral Lasix.  She was discharged on 40 mg of Lasix twice daily.  She was referred to nephrology at discharge.    She presents today for hospital discharge follow up.  She has been doing well and her weights have been trending down.  They followed up with nephrology last Friday with Dr. Dunn who increased Lasix to 80 mg in the morning and 40 mg in the evening.  She is to go back this Friday for follow-up blood work.  Her oxygen requirements have decreased they continue to check her oxygen levels which have been above 88 and when they do drop it rebounds quickly.  They are not adding salt to the food.  [facility] nurses are still coming into the home.  Swelling has decreased however still present.  No chest pain tightness pressure, palpitation, shortness of breath, orthopnea, PND, near-syncope, or syncope.      Allergies   Allergen Reactions   • Penicillins Swelling       Past Medical History:   Diagnosis Date   • Acute congestive heart failure (CMS/HCC)    • Anxiety    • Atrial fibrillation (CMS/HCC)    • Cardiomyopathy (CMS/HCC)    • Chronic diastolic (congestive) heart failure (CMS/HCC)    • CKD (chronic kidney disease), stage III (CMS/HCC)    • Dyslipidemia    • Elevated brain natriuretic peptide (BNP) level    • Elevated troponin    • Generalized weakness    • Glaucoma    •  "Hypertension    • Localized swelling of both lower legs    • Meningioma (CMS/HCC)    • Near syncope    • Neuropathy    • Paresthesia    • Pleural effusion due to CHF (congestive heart failure) (CMS/HCC)    • Renal insufficiency    • Short of breath on exertion    • Skin cancer    • TIA (transient ischemic attack)    • Weakness of lower extremity    • Weakness of right upper extremity        Past Surgical History:   Procedure Laterality Date   • APPENDECTOMY     • EYE SURGERY     • KNEE SURGERY Left     torn miniscus repair   • OOPHORECTOMY           Family and social history reviewed.     ROS  All other systems were reviewed and are negative          Objective:     Vitals:    08/21/19 1304   BP: 130/88   BP Location: Left arm   Patient Position: Sitting   Pulse: 58   Resp: 16   Weight: 58.8 kg (129 lb 9.6 oz)   Height: 170.2 cm (67\")     Body mass index is 20.3 kg/m².    PHYSICAL EXAM:  Physical Exam   Constitutional: She is oriented to person, place, and time. She appears well-developed and well-nourished. No distress.   HENT:   Head: Normocephalic.   Eyes: Conjunctivae are normal.   Neck: Normal range of motion. No JVD present.   Cardiovascular: Normal rate, regular rhythm, normal heart sounds and intact distal pulses.   No murmur heard.  Pulses:       Carotid pulses are 2+ on the right side, and 2+ on the left side.       Radial pulses are 2+ on the right side, and 2+ on the left side.        Posterior tibial pulses are 2+ on the right side, and 2+ on the left side.   Pulmonary/Chest: Effort normal. No respiratory distress. She has decreased breath sounds in the right lower field and the left lower field. She has no wheezes. She has no rhonchi. She has no rales. She exhibits no tenderness.   Abdominal: Soft. Bowel sounds are normal. She exhibits no distension.   Musculoskeletal: Normal range of motion. She exhibits edema (1-2+ reportedly improved).   Neurological: She is alert and oriented to person, place, and " time.   Skin: Skin is warm, dry and intact. No rash noted. She is not diaphoretic. No cyanosis.   Psychiatric: She has a normal mood and affect. Her behavior is normal. Judgment and thought content normal.         ECG 12 Lead  Date/Time: 8/21/2019 1:20 PM  Performed by: Heidy Lujan APRN  Authorized by: Heidy Lujan APRN   Comparison: compared with previous ECG from 4/26/2019  Similar to previous ECG  Rhythm: atrial fibrillation  Conduction: right bundle branch block    Clinical impression: abnormal EKG            Current Outpatient Medications   Medication Sig Dispense Refill   • aspirin 81 MG chewable tablet Chew 81 mg Daily.     • BELSOMRA 10 MG tablet TAKE 1 TABLET BY MOUTH EVERY EVENING 30 tablet 0   • docusate sodium (COLACE) 100 MG capsule Take 100 mg by mouth As Needed for Constipation.     • furosemide (LASIX) 40 MG tablet Take 120 mg by mouth 3 (Three) Times a Day. Pt is taking 80 mg in the morning and 40 mg in the afternoon     • guaiFENesin (MUCINEX) 600 MG 12 hr tablet Take 600 mg by mouth 2 (Two) Times a Day As Needed for Cough.     • melatonin 5 MG tablet tablet Take 5 mg by mouth Every Night.     • mirtazapine (REMERON) 15 MG tablet TAKE 1 TABLET BY MOUTH EVERY NIGHT AT BEDTIME 90 tablet 0   • pilocarpine (PILOCAR) 1 % ophthalmic solution Administer  to both eyes 2 (Two) Times a Day.     • potassium chloride (K-DUR) 10 MEQ CR tablet TAKE 1 TABLET BY MOUTH DAILY 90 tablet 1   • senna (SENOKOT) 8.6 MG tablet tablet Take 1 tablet by mouth At Night As Needed for Constipation.       No current facility-administered medications for this visit.      Assessment:       Diagnosis Plan   1. Chronic diastolic congestive heart failure (CMS/HCC)  ECG 12 Lead   2. Persistent atrial fibrillation (CMS/HCC)  ECG 12 Lead   3. Stage III chronic kidney disease (CMS/HCC)     4. Essential hypertension     5. History of transient ischemic attack (TIA)          Orders Placed This Encounter   Procedures   • ECG 12 Lead      This order was created via procedure documentation         Plan:       1.  Chronic congestive heart failure with normal left ventricular ejection fraction 60% with severe concentric hypertrophy suggestive of infiltrative cardiomyopathy/amyloid.  2.  Persistent atrial fibrillation no longer on anticoagulation candidate due to elderly, frailty and recurrent falls.  Warfarin was discontinued February 2019.  Atrial Fibrillation and Atrial Flutter  Assessment  • The patient has paroxysmal atrial fibrillation  • The patient's CHADS2-VASc score is 5  • A [id number] score of 2 or more is considered a high risk for a thromboembolic event    Plan  • Attempt to maintain sinus rhythm    3.  Right ventricular dysfunction-moderate with moderately dilated cavity on echo August 8, 2019  4.  Mitral annular calcification-moderate with mild regurgitation on most recent echo  5.  Mild pulmonary hypertension RVSP 41 mmHg  Weeks.    6.Stage III kidney disease follows with Dr. Dunn with recent increase of her diuretic last week to follow-up this Friday for lab work  7.  History of bilateral pleural effusion status post right  thoracentesis March 2019 with 1L removed at [facility] with transudate of fluid.  8.  8.  History of TIA 2016 in the setting of new onset atrial fibrillation      Follow up in 2 months call with questions or concerns            It has been a pleasure to participate in this patient's care.      Thank you,  KVNG Moses      **Asher Disclaimer:**  Much of this encounter note is an electronic transcription/translation of spoken language to printed text. The electronic translation of spoken language may permit erroneous, or at times, nonsensical words or phrases to be inadvertently transcribed. Although I have reviewed the note for such errors, some may still exist.

## 2019-08-22 ENCOUNTER — READMISSION MANAGEMENT (OUTPATIENT)
Dept: CALL CENTER | Facility: HOSPITAL | Age: 84
End: 2019-08-22

## 2019-08-22 NOTE — OUTREACH NOTE
CHF Week 2 Survey      Responses   Facility patient discharged from?  [place]   Does the patient have one of the following disease processes/diagnoses(primary or secondary)?  CHF   Week 2 attempt successful?  Yes   Call start time  1231   Call end time  1237   Discharge diagnosis   CHF,  A-fib   Is patient permission given to speak with other caregiver?  Yes   Person spoke with today (if not patient) and relationship  Arvin son in law   Meds reviewed with patient/caregiver?  Yes   Is the patient having any side effects they believe may be caused by any medication additions or changes?  No   Does the patient have all medications ordered at discharge?  N/A   Is the patient taking all medications as directed (includes completed medication regime)?  Yes   Does the patient have a primary care provider?   Yes   Does the patient have an appointment with their PCP within 7 days of discharge?  Yes   Has the patient kept scheduled appointments due by today?  Yes   What is the Home health agency?   [facility]   Has home health visited the patient within 72 hours of discharge?  Yes   Psychosocial issues?  No   Did the patient receive a copy of their discharge instructions?  Yes   Nursing interventions  Reviewed instructions with patient   What is the patient's perception of their health status since discharge?  Improving   Nursing interventions  Nurse provided patient education   Is the patient weighing daily?  Yes   Does the patient have scales?  Yes   Daily weight interventions  Education provided on importance of daily weight   Is the patient able to teach back Heart Failure diet management?  Yes   Is the patient able to teach back Heart Failure Zones?  Yes   Is the patient able to teach back signs and symptoms of worsening condition? (i.e. weight gain, shortness of air, etc.)  Yes   Is the patient/caregiver able to teach back the hierarchy of who to call/visit for symptoms/problems? PCP, Specialist, Home health nurse,  Urgent Care, ED, 911  Yes   Additional teach back comments  She gets weighed daily, sodium is monitored, she walks better. Discussed daily weights. She is doing much better but still curious about SNF.   CHF Week 2 call completed?  Yes          Karen Morfin RN

## 2019-08-27 ENCOUNTER — TELEPHONE (OUTPATIENT)
Dept: CARDIOLOGY | Facility: CLINIC | Age: 84
End: 2019-08-27

## 2019-08-29 ENCOUNTER — READMISSION MANAGEMENT (OUTPATIENT)
Dept: CALL CENTER | Facility: HOSPITAL | Age: 84
End: 2019-08-29

## 2019-08-29 NOTE — TELEPHONE ENCOUNTER
Dr Ellis is NOT here.  [facility] d/c at discharge and there is no documentation as to why.    I will send to Josie also to see what her input is.

## 2019-08-29 NOTE — OUTREACH NOTE
CHF Week 3 Survey      Responses   Facility patient discharged from?  [place]   Does the patient have one of the following disease processes/diagnoses(primary or secondary)?  CHF   Week 3 attempt successful?  No   Unsuccessful attempts  Attempt 1          Aron Ken RN

## 2019-08-29 NOTE — TELEPHONE ENCOUNTER
It was documented by the RN that the patient was no longer taking these drops so they were stopped. No cardiac indication to stop these. OK to proceed with any recommendations per Dr. Arceo.

## 2019-08-29 NOTE — TELEPHONE ENCOUNTER
Dr. Ellis,    Dr. Arceo called again today. The pt's family keeps calling his office asking if she can restart the eyedrops. Dr. Arceo states that she is not a surgical candidate and that's why she needs them. He is going to put her back on the eye drops until he hears differently from you.    Call back #: [phone] (Dr. Arceo's office)    Thank you!    Cristine Arteaga, RN  Triage RN [facility]

## 2019-08-30 ENCOUNTER — READMISSION MANAGEMENT (OUTPATIENT)
Dept: CALL CENTER | Facility: HOSPITAL | Age: 84
End: 2019-08-30

## 2019-08-30 NOTE — OUTREACH NOTE
CHF Week 3 Survey      Responses   Facility patient discharged from?  [place]   Does the patient have one of the following disease processes/diagnoses(primary or secondary)?  CHF   Week 3 attempt successful?  Yes   Call start time  1340   Call end time  1348   Discharge diagnosis   CHF,  A-fib   Is patient permission given to speak with other caregiver?  Yes   List who call center can speak with  daughter, Meghana, and  Arvin, son in law   Person spoke with today (if not patient) and relationship  Arvin, son in law   Meds reviewed with patient/caregiver?  Yes   Is the patient taking all medications as directed (includes completed medication regime)?  Yes   Has the patient kept scheduled appointments due by today?  Yes   What is the Home health agency?   [facility] health comments  Active with [facility].    Psychosocial issues?  No   Did the patient receive a copy of their discharge instructions?  Yes   What is the patient's perception of their health status since discharge?  Improving   Is the patient weighing daily?  Yes   Does the patient have scales?  Yes   Daily weight interventions  Education provided on importance of daily weight   Is the patient able to teach back signs and symptoms of worsening condition? (i.e. weight gain, shortness of air, etc.)  Yes   Is the patient/caregiver able to teach back the hierarchy of who to call/visit for symptoms/problems? PCP, Specialist, Home health nurse, Urgent Care, ED, 911  Yes   CHF Week 3 call completed?  Yes          Lindsay Longoria RN

## 2019-09-06 ENCOUNTER — READMISSION MANAGEMENT (OUTPATIENT)
Dept: CALL CENTER | Facility: HOSPITAL | Age: 84
End: 2019-09-06

## 2019-09-06 NOTE — OUTREACH NOTE
CHF Week 4 Survey      Responses   Facility patient discharged from?  [place]   Does the patient have one of the following disease processes/diagnoses(primary or secondary)?  CHF   Week 4 attempt successful?  Yes   Call start time  1549   Call end time  1556   Discharge diagnosis   CHF,  A-rom   Person spoke with today (if not patient) and relationship  Arvin, son in law   Meds reviewed with patient/caregiver?  Yes   Is the patient taking all medications as directed (includes completed medication regime)?  Yes   Has the patient kept scheduled appointments due by today?  Yes   Is the patient still receiving Home Health Services?  Yes   DME comments  Home O2 in place now.    Psychosocial issues?  No   Comments  Pt has stayed home, chose not to go to rehab per family. Pt is doing better.    What is the patient's perception of their health status since discharge?  Improving   Is the patient weighing daily?  Yes   Is the patient able to teach back Heart Failure diet management?  Yes   Is the patient able to teach back Heart Failure Zones?  Yes   Is the patient/caregiver able to teach back the hierarchy of who to call/visit for symptoms/problems? PCP, Specialist, Home health nurse, Urgent Care, ED, 911  Yes   Additional teach back comments  She gets weighed daily, sodium is monitored, she walks better. Discussed daily weights. She is doing much better but still curious about SNF.   Week 4 Call Completed?  Yes   Would the patient like one additional call?  No   Graduated  Yes   Did the patient feel the follow up calls were helpful during their recovery period?  Yes   Was the number of calls appropriate?  Yes          Cyn Lopez RN

## 2019-09-09 ENCOUNTER — EPISODE CHANGES (OUTPATIENT)
Dept: CASE MANAGEMENT | Facility: OTHER | Age: 84
End: 2019-09-09

## 2019-09-29 DIAGNOSIS — F41.0 PANIC ATTACKS: ICD-10-CM

## 2019-09-29 DIAGNOSIS — F51.01 PRIMARY INSOMNIA: ICD-10-CM

## 2019-09-30 RX ORDER — MIRTAZAPINE 15 MG/1
TABLET, FILM COATED ORAL
Qty: 90 TABLET | Refills: 0 | Status: SHIPPED | OUTPATIENT
Start: 2019-09-30 | End: 2020-02-24 | Stop reason: HOSPADM

## 2019-10-09 ENCOUNTER — PATIENT OUTREACH (OUTPATIENT)
Dept: CASE MANAGEMENT | Facility: OTHER | Age: 84
End: 2019-10-09

## 2019-10-09 NOTE — OUTREACH NOTE
Care Plan Note      Responses   Lifestyle Goals  Avoid respiratory irritants, Decrease falls risk, Eat a healthy diet, Fewer exacerbations, Medication management, Routine follow-up with doctor(s), Record weight daily   Self Management  Check Weight Daily, Get flu/pneumonia shot, Medication Adherence   Annual Wellness Visit:   Patient Has Completed   Care Gaps Addressed  Flu Shot   Flu Shot Status  Patient will Complete   Flu Shot Comments  Plans to follow-up when sore throat resolved.   Specific Disease Process Teaching  Heart Failure   Other Patient Education/Resources   Advanced Care Planning   ACP Education Method  Send Materials   Does patient have depression diagnosis?  No   Advanced Directives:  Send Materials   Medication Adherence  Medications understood   How often do you have someone help you read hospital materials?  Always   Health Literacy  Good        The main concerns and/or symptoms the patient would like to address are: Recent complaints of a sore throat which is currently resolving.    Education/instruction provided by Care Coordinator: Home oxygen use and safety precautions with use.  Informed would contact [facility] to add humidified air to concentrator.  Should assist with alleviating dryness to nares and throat.  Had pneumonia vaccine two years ago, shingles vaccine, and to follow-up with flu shot once sore throat completely alleviated. Reports does have issues with allergens.  Weight is stable and adherent to low sodium diet, weights, and medication regimen.  Her son-in-law is an  assisting with advance directives.  AWV has been completed.      Follow Up Outreach Due: One month and as needed.      Mila Ruffin RN  Community Care Coordinator    10/9/2019, 10:36 AM

## 2019-10-09 NOTE — OUTREACH NOTE
"Care Coordination Note    Spoke with \"[facility]" [facility], and will contact patient's daughter per cell phone number, to arrange delivery tomorrow with addition of humidified air addition to concentrator.      Mila Ruffin RN  Community Care Coordinator    10/9/2019, 11:02 AM      "

## 2019-11-05 ENCOUNTER — PATIENT OUTREACH (OUTPATIENT)
Dept: CASE MANAGEMENT | Facility: OTHER | Age: 84
End: 2019-11-05

## 2019-11-05 NOTE — OUTREACH NOTE
Patient Outreach Note    Spoke with patient's son-in-law due to her daughter is not available.  No concerns or issues expressed.  Attempted to discuss past issues with sore throat and dryness of nares but unable to state if issue resolved with addition of humidified air to oxygen.     Mila Ruffin RN  Community Care Coordinator    11/5/2019, 11:39 AM

## 2019-12-04 ENCOUNTER — PATIENT OUTREACH (OUTPATIENT)
Dept: CASE MANAGEMENT | Facility: OTHER | Age: 84
End: 2019-12-04

## 2019-12-04 ENCOUNTER — EPISODE CHANGES (OUTPATIENT)
Dept: CASE MANAGEMENT | Facility: OTHER | Age: 84
End: 2019-12-04

## 2019-12-04 NOTE — OUTREACH NOTE
Patient Outreach Note    Patient has met care plan goals, all care gaps have been addressed, and patient has a strong sense of health self-management.  Daughter reports that pneumonia and shingle vaccines administered with a series of two each within the last ten years.  States patient is doing very well for her age.  The patient's annual wellness visit is due and she will schedule after appt with cardiologist in January. . Mychart activation has been discussed and patient is not active. . Patient has received advanced care planning materials or has an active advanced care plan in place.    Mila Ruffin RN  Ambulatory     12/4/2019, 10:31 AM

## 2019-12-13 RX ORDER — FUROSEMIDE 40 MG/1
TABLET ORAL
Qty: 270 TABLET | Refills: 0 | Status: SHIPPED | OUTPATIENT
Start: 2019-12-13 | End: 2020-05-20

## 2020-01-06 RX ORDER — POTASSIUM CHLORIDE 750 MG/1
TABLET, FILM COATED, EXTENDED RELEASE ORAL
Qty: 90 TABLET | Refills: 1 | Status: SHIPPED | OUTPATIENT
Start: 2020-01-06 | End: 2020-07-02

## 2020-01-27 ENCOUNTER — OFFICE VISIT (OUTPATIENT)
Dept: CARDIOLOGY | Facility: CLINIC | Age: 85
End: 2020-01-27

## 2020-01-27 ENCOUNTER — APPOINTMENT (OUTPATIENT)
Dept: LAB | Facility: HOSPITAL | Age: 85
End: 2020-01-27

## 2020-01-27 VITALS
OXYGEN SATURATION: 93 % | BODY MASS INDEX: 20.41 KG/M2 | DIASTOLIC BLOOD PRESSURE: 74 MMHG | SYSTOLIC BLOOD PRESSURE: 132 MMHG | HEART RATE: 63 BPM | WEIGHT: 115.2 LBS | HEIGHT: 63 IN

## 2020-01-27 DIAGNOSIS — I42.5 OTHER RESTRICTIVE CARDIOMYOPATHY (HCC): ICD-10-CM

## 2020-01-27 DIAGNOSIS — N28.9 RENAL INSUFFICIENCY: ICD-10-CM

## 2020-01-27 DIAGNOSIS — I50.43 ACUTE ON CHRONIC COMBINED SYSTOLIC AND DIASTOLIC CONGESTIVE HEART FAILURE (HCC): Primary | ICD-10-CM

## 2020-01-27 DIAGNOSIS — I50.32 CHRONIC DIASTOLIC CONGESTIVE HEART FAILURE (HCC): ICD-10-CM

## 2020-01-27 DIAGNOSIS — R94.31 PROLONGED Q-T INTERVAL ON ECG: ICD-10-CM

## 2020-01-27 DIAGNOSIS — I48.21 PERMANENT ATRIAL FIBRILLATION (HCC): ICD-10-CM

## 2020-01-27 DIAGNOSIS — I10 ESSENTIAL HYPERTENSION: ICD-10-CM

## 2020-01-27 LAB
ANION GAP SERPL CALCULATED.3IONS-SCNC: 10.9 MMOL/L (ref 5–15)
BASOPHILS # BLD AUTO: 0.01 10*3/MM3 (ref 0–0.2)
BASOPHILS NFR BLD AUTO: 0.2 % (ref 0–1.5)
BUN BLD-MCNC: 39 MG/DL (ref 8–23)
BUN/CREAT SERPL: 37.1 (ref 7–25)
CALCIUM SPEC-SCNC: 10.2 MG/DL (ref 8.2–9.6)
CHLORIDE SERPL-SCNC: 99 MMOL/L (ref 98–107)
CO2 SERPL-SCNC: 30.1 MMOL/L (ref 22–29)
CREAT BLD-MCNC: 1.05 MG/DL (ref 0.57–1)
DEPRECATED RDW RBC AUTO: 43.8 FL (ref 37–54)
EOSINOPHIL # BLD AUTO: 0.14 10*3/MM3 (ref 0–0.4)
EOSINOPHIL NFR BLD AUTO: 3.1 % (ref 0.3–6.2)
ERYTHROCYTE [DISTWIDTH] IN BLOOD BY AUTOMATED COUNT: 13.1 % (ref 12.3–15.4)
GFR SERPL CREATININE-BSD FRML MDRD: 49 ML/MIN/1.73
GLUCOSE BLD-MCNC: 93 MG/DL (ref 65–99)
HCT VFR BLD AUTO: 38.4 % (ref 34–46.6)
HGB BLD-MCNC: 12.1 G/DL (ref 12–15.9)
IMM GRANULOCYTES # BLD AUTO: 0 10*3/MM3 (ref 0–0.05)
IMM GRANULOCYTES NFR BLD AUTO: 0 % (ref 0–0.5)
LYMPHOCYTES # BLD AUTO: 0.72 10*3/MM3 (ref 0.7–3.1)
LYMPHOCYTES NFR BLD AUTO: 16.2 % (ref 19.6–45.3)
MCH RBC QN AUTO: 28.8 PG (ref 26.6–33)
MCHC RBC AUTO-ENTMCNC: 31.5 G/DL (ref 31.5–35.7)
MCV RBC AUTO: 91.4 FL (ref 79–97)
MONOCYTES # BLD AUTO: 0.42 10*3/MM3 (ref 0.1–0.9)
MONOCYTES NFR BLD AUTO: 9.4 % (ref 5–12)
NEUTROPHILS # BLD AUTO: 3.16 10*3/MM3 (ref 1.7–7)
NEUTROPHILS NFR BLD AUTO: 71.1 % (ref 42.7–76)
NRBC BLD AUTO-RTO: 0 /100 WBC (ref 0–0.2)
NT-PROBNP SERPL-MCNC: 8522 PG/ML (ref 5–1800)
PLATELET # BLD AUTO: 82 10*3/MM3 (ref 140–450)
PMV BLD AUTO: 11.6 FL (ref 6–12)
POTASSIUM BLD-SCNC: 4.6 MMOL/L (ref 3.5–5.2)
RBC # BLD AUTO: 4.2 10*6/MM3 (ref 3.77–5.28)
SODIUM BLD-SCNC: 140 MMOL/L (ref 136–145)
WBC NRBC COR # BLD: 4.45 10*3/MM3 (ref 3.4–10.8)

## 2020-01-27 PROCEDURE — 85025 COMPLETE CBC W/AUTO DIFF WBC: CPT | Performed by: INTERNAL MEDICINE

## 2020-01-27 PROCEDURE — 93000 ELECTROCARDIOGRAM COMPLETE: CPT | Performed by: INTERNAL MEDICINE

## 2020-01-27 PROCEDURE — 80048 BASIC METABOLIC PNL TOTAL CA: CPT | Performed by: INTERNAL MEDICINE

## 2020-01-27 PROCEDURE — 99214 OFFICE O/P EST MOD 30 MIN: CPT | Performed by: INTERNAL MEDICINE

## 2020-01-27 PROCEDURE — 83880 ASSAY OF NATRIURETIC PEPTIDE: CPT | Performed by: INTERNAL MEDICINE

## 2020-01-27 PROCEDURE — 36415 COLL VENOUS BLD VENIPUNCTURE: CPT | Performed by: INTERNAL MEDICINE

## 2020-01-29 ENCOUNTER — TELEPHONE (OUTPATIENT)
Dept: CARDIOLOGY | Facility: CLINIC | Age: 85
End: 2020-01-29

## 2020-01-29 NOTE — TELEPHONE ENCOUNTER
Please let and daughter patient know that blood test show stable and slightly abnormal renal function (better than in August) and fluid test still abnormal but better than before.  Find out how they are doing with leg elevation and his edema better?  If not may need to increase diuretics and recheck labs in 2 weeks

## 2020-01-30 NOTE — TELEPHONE ENCOUNTER
Spoke with Meghana (daughter) @ [phone] re: labs.  Verbalized understanding.  Swelling is doing so much better compared to August.

## 2020-01-31 NOTE — TELEPHONE ENCOUNTER
To hear she is better.  Have her stay on the same dosage of diuretics at this point as well as leg elevation as she has been.  Have her follow-up with PCP regarding renal dysfunction.. zita

## 2020-01-31 NOTE — TELEPHONE ENCOUNTER
Spoke with pt's daughter.  Verbalized understanding and she will find out from PCP on watching labs.  (done)

## 2020-02-02 ENCOUNTER — TELEPHONE (OUTPATIENT)
Dept: CARDIOLOGY | Facility: CLINIC | Age: 85
End: 2020-02-02

## 2020-02-02 PROBLEM — I50.32 CHRONIC DIASTOLIC CONGESTIVE HEART FAILURE (HCC): Status: ACTIVE | Noted: 2020-02-02

## 2020-02-02 NOTE — TELEPHONE ENCOUNTER
Please ask pharmacy to review meds for QTC prolongation.  Also see if magnesium and TSH can be added onto labs just recently drawn

## 2020-02-06 NOTE — TELEPHONE ENCOUNTER
I talked to Meghana patient's daughter and she will talk with Dr. Mccoy about an alternative to Remeron.  She will decrease the dose from 4 times a week to 3 times a week in the interim.  She will call Dr. Mccoy tomorrow.  I had recommended that when the patient is off Remeron completely an EKG be done a week later and if QTC is prolonged may need to recheck magnesium at that time.zita

## 2020-02-21 NOTE — TELEPHONE ENCOUNTER
Spoke with pt's daughter (Meghana) who took the pt off Remeron about 2 weeks ago on her own.  She did not call or inform Dr Mccoy.  I strongly advised her to call them after she got off the phone to let them know.  Pt will need an EKG and lab on Monday.      Bro- Please schedule.  EKG ONLY.

## 2020-02-24 ENCOUNTER — CLINICAL SUPPORT (OUTPATIENT)
Dept: CARDIOLOGY | Facility: CLINIC | Age: 85
End: 2020-02-24

## 2020-02-24 DIAGNOSIS — R94.31 PROLONGED Q-T INTERVAL ON ECG: Primary | ICD-10-CM

## 2020-02-24 PROCEDURE — 93000 ELECTROCARDIOGRAM COMPLETE: CPT | Performed by: INTERNAL MEDICINE

## 2020-02-24 NOTE — PROGRESS NOTES
Procedure     ECG 12 Lead  Date/Time: 2/24/2020 3:54 PM  Performed by: Frida Ellis MD  Authorized by: Zainab Perez, DNP, APRN   Comparison: compared with previous ECG from 2/2/2020  Comparison to previous ECG: QTc has improved to within normal limits  Rhythm: atrial fibrillation  Rate: bradycardic  BPM: 57  Conduction: right bundle branch block    Clinical impression: abnormal EKG           02/24/2020   Pt came to office for a EKG only to monitor pt's QT interval due to   pt stopping  her Remeron.   Medication were reviewed and updated.   Cristine YOUSIF reviewed EKg and stated QT interval had improved.   She did advise to check with PCP about another medication to replace the Remeron that would not interfer with heart rhythm.   I gave message to pt and her daughter. They understood.   Gustavo CALLAHAN

## 2020-05-20 RX ORDER — FUROSEMIDE 40 MG/1
TABLET ORAL
Qty: 270 TABLET | Refills: 1 | Status: SHIPPED | OUTPATIENT
Start: 2020-05-20 | End: 2021-02-18

## 2020-05-21 ENCOUNTER — TELEPHONE (OUTPATIENT)
Dept: FAMILY MEDICINE CLINIC | Facility: CLINIC | Age: 85
End: 2020-05-21

## 2020-07-02 RX ORDER — POTASSIUM CHLORIDE 750 MG/1
TABLET, FILM COATED, EXTENDED RELEASE ORAL
Qty: 90 TABLET | Refills: 1 | Status: SHIPPED | OUTPATIENT
Start: 2020-07-02 | End: 2021-01-12

## 2020-08-05 ENCOUNTER — OFFICE VISIT (OUTPATIENT)
Dept: CARDIOLOGY | Facility: CLINIC | Age: 85
End: 2020-08-05

## 2020-08-05 DIAGNOSIS — I50.32 CHRONIC DIASTOLIC CONGESTIVE HEART FAILURE (HCC): ICD-10-CM

## 2020-08-05 DIAGNOSIS — I48.19 PERSISTENT ATRIAL FIBRILLATION (HCC): Primary | ICD-10-CM

## 2020-08-05 DIAGNOSIS — R06.09 CHRONIC DYSPNEA: ICD-10-CM

## 2020-08-05 PROCEDURE — 99214 OFFICE O/P EST MOD 30 MIN: CPT | Performed by: NURSE PRACTITIONER

## 2020-08-05 PROCEDURE — 93000 ELECTROCARDIOGRAM COMPLETE: CPT | Performed by: NURSE PRACTITIONER

## 2020-08-05 NOTE — PROGRESS NOTES
Date of Office Visit: 2020  Encounter Provider: Zainab Perez, PAT, APRN  Place of Service: [facility] CARDIOLOGY  Patient Name: Christiano Bryant  :3/7/1923        Subjective:     Chief Complaint:  Follow-up, atrial fibrillation, diastolic dysfunction      History of Present Illness:  Christiano Bryant is a 89 y.o. female patient of Dr. Ellis.  I am seeing this patient in the office today and I have reviewed her records.    Patient has a history of renal insufficiency, neuropathy, meningioma, persistent atrial fibrillation, TIA.    In  patient had a TIA in the setting of new onset atrial fibrillation.  Echocardiogram showed normal LV systolic function without significant valvular heart disease.  She was started on Eliquis.  She was subsequently switched to Coumadin.  2018 she was seen for congestive heart failure and diuretics were increased though care was complicated by renal insufficiency.  2019 she was admitted to [facility] with generalized weakness and weight loss.  CT scan of the abdomen showed pelvic mass and she was noted to have bilateral pleural effusions for which she underwent thoracentesis.  She was not felt to be a candidate for further surgical intervention or chemotherapy.  They opted not to pursue further aggressive assessment.  Effusion was felt to be transudate of.  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  Patient was noted to be a poor candidate for treatment of amyloid heart disease and conservative approach was taken.  She was last seen in the office 2020 by Dr. Ellis at which point her dependent edema had improved and overall she was feeling well on a low-salt diet.      Patient presents to office today for follow-up  appointment.  Patient's daughter is with her in the office today, per patient preference.  Patient reports she is doing okay overall since last visit.  She continues to have some chronic shortness of breath.  It is worse when she is feeling anxious or when she is out in the heat.  She reports that she has been checking oxygen levels at home and when she is feeling short of breath her oxygen levels are 95% on room air however the oxygen makes her feel more comfortable.  She denies any fever, chills, lower extremity edema, or weight gain.  She does get an occasional cough first thing in the morning however this is chronic intermittent and not new or worsening.  She denies any chest pain/discomfort, palpitations, racing heartbeat sensation, dizziness, syncope, or abnormal bleeding.  She continues to have some chronic fatigue which she attributes to her age.  She does have a history of falls and did fall a while back after her  leaked and she slipped but no real injuries and she has since recovered.  She continues to live alone but her daughter Meghana lives 5 minutes away and comes over twice a day and patient son also helps out and they feel that this arrangement works for them right now.            Past Medical History:   Diagnosis Date   • Acute congestive heart failure (CMS/HCC)    • Anxiety    • Atrial fibrillation (CMS/HCC)    • Cardiomyopathy (CMS/HCC)    • Chronic diastolic (congestive) heart failure (CMS/HCC)    • CKD (chronic kidney disease), stage III (CMS/HCC)    • Dyslipidemia    • Elevated brain natriuretic peptide (BNP) level    • Elevated troponin    • Generalized weakness    • Glaucoma    • Hypertension    • Localized swelling of both lower legs    • Meningioma (CMS/HCC)    • Near syncope    • Neuropathy    • Paresthesia    • Pleural effusion due to CHF (congestive heart failure) (CMS/HCC)    • Renal insufficiency    • Short of breath on exertion    • Skin cancer    • TIA (transient ischemic  attack)    • Weakness of lower extremity    • Weakness of right upper extremity      Past Surgical History:   Procedure Laterality Date   • APPENDECTOMY     • EYE SURGERY     • KNEE SURGERY Left     torn miniscus repair   • OOPHORECTOMY       Outpatient Medications Prior to Visit   Medication Sig Dispense Refill   • aspirin 81 MG chewable tablet Chew 81 mg Daily.     • doxylamine (UNISOM) 25 MG tablet Take 25 mg by mouth At Night As Needed for Sleep.     • furosemide (LASIX) 40 MG tablet TAKE 2 TABLETS BY MOUTH EVERY MORNING AND 1 TABLET BY MOUTH EVERY EVENING 270 tablet 1   • guaiFENesin (MUCINEX) 600 MG 12 hr tablet Take 600 mg by mouth 2 (Two) Times a Day As Needed for Cough.     • melatonin 5 MG tablet tablet Take 5 mg by mouth Every Night.     • pilocarpine (PILOCAR) 1 % ophthalmic solution Administer  to both eyes 2 (Two) Times a Day.     • potassium chloride (K-DUR) 10 MEQ CR tablet TAKE 1 TABLET BY MOUTH EVERY DAY 90 tablet 1   • senna (SENOKOT) 8.6 MG tablet tablet Take 1 tablet by mouth At Night As Needed for Constipation.       No facility-administered medications prior to visit.        Allergies as of 08/05/2020 - Reviewed 02/02/2020   Allergen Reaction Noted   • Penicillins Swelling 10/20/2014     Social History     Socioeconomic History   • Marital status:      Spouse name: Not on file   • Number of children: Not on file   • Years of education: Not on file   • Highest education level: Not on file   Tobacco Use   • Smoking status: Never Smoker   • Smokeless tobacco: Never Used   • Tobacco comment: caffeine daily   Substance and Sexual Activity   • Alcohol use: Yes     Alcohol/week: 4.0 standard drinks     Types: 4 Shots of liquor per week     Comment: cristina 4/7 days   • Drug use: No   • Sexual activity: Never     Family History   Problem Relation Age of Onset   • Angina Mother    • Cancer Father    • No Known Problems Maternal Grandmother    • No Known Problems Maternal Grandfather    • No  "Known Problems Paternal Grandmother    • No Known Problems Paternal Grandfather        Review of Systems   Constitution: Positive for malaise/fatigue and weight loss. Negative for chills, fever and weight gain.   HENT: Negative for ear pain, hearing loss, nosebleeds and sore throat.    Eyes: Negative for blurred vision, double vision, redness and visual disturbance.   Cardiovascular:        SEE HPI   Respiratory: Positive for cough and shortness of breath. Negative for snoring and wheezing.    Endocrine: Negative for cold intolerance and heat intolerance.   Skin: Negative for itching, rash and suspicious lesions.   Musculoskeletal: Negative for joint pain, joint swelling and myalgias.   Gastrointestinal: Negative for abdominal pain, diarrhea, hematemesis, melena, nausea and vomiting.   Genitourinary: Positive for frequency (\"always has\"). Negative for dysuria and hematuria.   Neurological: Negative for dizziness, headaches, numbness, paresthesias and seizures.   Psychiatric/Behavioral: Negative for altered mental status and depression. The patient is not nervous/anxious.           Objective:     /66,   HR 52    Height: 63\"  Weight: 124 lb        PHYSICAL EXAM:  Physical Exam   Constitutional: She is oriented to person, place, and time. She appears well-developed and well-nourished. No distress.   Frail   HENT:   Head: Normocephalic and atraumatic.   Eyes: Pupils are equal, round, and reactive to light.   Neck: Neck supple. No JVD present. Carotid bruit is not present.   Cardiovascular: Normal rate, normal heart sounds and intact distal pulses. An irregularly irregular rhythm present. Exam reveals no gallop and no friction rub.   No murmur heard.  Pulses:       Radial pulses are 2+ on the right side, and 2+ on the left side.        Posterior tibial pulses are 2+ on the right side, and 2+ on the left side.   Pulmonary/Chest: Effort normal and breath sounds normal. No respiratory distress. She has no wheezes. She " has no rales.   Abdominal: Soft. Bowel sounds are normal. She exhibits no distension.   Musculoskeletal: She exhibits no edema, tenderness or deformity.   Neurological: She is alert and oriented to person, place, and time.   Skin: Skin is warm and dry. No rash noted. She is not diaphoretic. No erythema.   Psychiatric: She has a normal mood and affect. Her behavior is normal. Judgment normal.           ECG 12 Lead  Date/Time: 8/5/2020 12:31 PM  Performed by: Zainab Perez DNP, APRN  Authorized by: Zainab Perez DNP, KVNG   Comparison: compared with previous ECG from 2/24/2020  Similar to previous ECG  Rhythm: atrial fibrillation  Ectopy comments: PVC  Rate: bradycardic  BPM: 52  Conduction: right bundle branch block  Other findings: non-specific ST-T wave changes  Comments: No significant changes from previous EKG              Assessment:       Diagnosis Plan   1. Persistent atrial fibrillation (CMS/HCC)     2. Chronic diastolic congestive heart failure (CMS/HCC)     3. Chronic dyspnea           Plan:     1. Chronic shortness of breath: She appears euvolemic on exam today.  No lower extremity swelling, abdominal tightness or bloating, and lungs are clear.  We discussed that given her history we could repeat a chest x-ray however she declines at this time.  They are also not interested in an echocardiogram at this time as patient would not want any invasive procedures and it is not felt that it would likely change plan of care.  It sounds like she mostly feels short of breath when she is feeling very anxious in the setting of normal oxygen levels on room air, per patient and her daughter.  They report she does have a history of anxiety for which she follows with primary care and primary care is also managing her nasal cannula O2.  I did recommend that they discuss this with primary care provider.  They will call our office for any new or worsening symptoms prior to next visit or if they wish to pursue  additional testing.  2. Persistent atrial fibrillation: Poor anticoagulation candidate given frailty and history of falls.  Remains on low-dose aspirin 81 mg.  Heart rate remains well controlled.  3. Chronic diastolic heart failure: With preserved LV function.  Patient appears euvolemic on exam.  Lungs are clear, no lower extremity swelling, no abdominal swelling/bloating/tightness.  4. Lower extremity edema: Resolved.  5. Renal insufficiency  6. Dyslipidemia: PCP following.  7. History of TIA  8. Prolonged QTc interval: Improved to normal on subsequent EKG and remains within normal limits on EKG today.  9. Anxiety: PCP managing.  They will call to schedule follow-up with PCP to discuss in more detail.    Patient to keep February 2021 follow-up with Dr. Ellis as scheduled or follow-up sooner if needed for any new, recurrent, or worsening symptoms or other issues/concerns.             Your medication list           Accurate as of August 5, 2020 11:59 PM. If you have any questions, ask your nurse or doctor.               CONTINUE taking these medications      Instructions Last Dose Given Next Dose Due   aspirin 81 MG chewable tablet      Chew 81 mg Daily.       doxylamine 25 MG tablet  Commonly known as:  UNISOM      Take 25 mg by mouth At Night As Needed for Sleep.       furosemide 40 MG tablet  Commonly known as:  LASIX      TAKE 2 TABLETS BY MOUTH EVERY MORNING AND 1 TABLET BY MOUTH EVERY EVENING       guaiFENesin 600 MG 12 hr tablet  Commonly known as:  MUCINEX      Take 600 mg by mouth 2 (Two) Times a Day As Needed for Cough.       melatonin 5 MG tablet tablet      Take 5 mg by mouth Every Night.       pilocarpine 1 % ophthalmic solution  Commonly known as:  PILOCAR      Administer  to both eyes 2 (Two) Times a Day.       potassium chloride 10 MEQ CR tablet  Commonly known as:  K-DUR      TAKE 1 TABLET BY MOUTH EVERY DAY       senna 8.6 MG tablet  Commonly known as:  SENOKOT      Take 1 tablet by mouth At Night As  Needed for Constipation.              I did not stop or change the above medications.  Patient's medication list was updated to reflect medications they are currently taking including medication changes made by other providers.        Thanks,    Zainab Perez, PAT, APRN  08/05/2020         Dictated utilizing Dragon dictation

## 2020-09-22 ENCOUNTER — OFFICE VISIT (OUTPATIENT)
Dept: INTERNAL MEDICINE | Facility: CLINIC | Age: 85
End: 2020-09-22

## 2020-09-22 VITALS
DIASTOLIC BLOOD PRESSURE: 68 MMHG | WEIGHT: 122.6 LBS | SYSTOLIC BLOOD PRESSURE: 132 MMHG | HEART RATE: 67 BPM | OXYGEN SATURATION: 95 % | BODY MASS INDEX: 21.72 KG/M2 | HEIGHT: 63 IN

## 2020-09-22 DIAGNOSIS — I10 ESSENTIAL HYPERTENSION: ICD-10-CM

## 2020-09-22 DIAGNOSIS — E85.4 AMYLOID HEART DISEASE (HCC): ICD-10-CM

## 2020-09-22 DIAGNOSIS — F32.A ANXIETY AND DEPRESSION: Primary | ICD-10-CM

## 2020-09-22 DIAGNOSIS — Z63.6 CAREGIVER STRESS: ICD-10-CM

## 2020-09-22 DIAGNOSIS — Z71.89 GOALS OF CARE, COUNSELING/DISCUSSION: ICD-10-CM

## 2020-09-22 DIAGNOSIS — F51.01 PRIMARY INSOMNIA: ICD-10-CM

## 2020-09-22 DIAGNOSIS — F41.9 ANXIETY AND DEPRESSION: Primary | ICD-10-CM

## 2020-09-22 DIAGNOSIS — Z99.81 DEPENDENCE ON SUPPLEMENTAL OXYGEN: ICD-10-CM

## 2020-09-22 DIAGNOSIS — I43 AMYLOID HEART DISEASE (HCC): ICD-10-CM

## 2020-09-22 DIAGNOSIS — N18.30 STAGE III CHRONIC KIDNEY DISEASE (HCC): ICD-10-CM

## 2020-09-22 DIAGNOSIS — Z66 DNR (DO NOT RESUSCITATE): ICD-10-CM

## 2020-09-22 PROBLEM — I50.9 PLEURAL EFFUSION DUE TO CHF (CONGESTIVE HEART FAILURE) (HCC): Status: RESOLVED | Noted: 2019-03-26 | Resolved: 2020-09-22

## 2020-09-22 PROBLEM — R19.00 PELVIC MASS: Status: ACTIVE | Noted: 2020-09-22

## 2020-09-22 PROBLEM — Z79.899 HIGH RISK MEDICATION USE: Status: RESOLVED | Noted: 2017-11-03 | Resolved: 2020-09-22

## 2020-09-22 PROBLEM — M17.12 ARTHRITIS OF LEFT KNEE: Status: RESOLVED | Noted: 2018-01-19 | Resolved: 2020-09-22

## 2020-09-22 PROBLEM — N28.9 RENAL INSUFFICIENCY: Status: RESOLVED | Noted: 2019-03-26 | Resolved: 2020-09-22

## 2020-09-22 PROBLEM — K56.41 FECAL IMPACTION (HCC): Status: RESOLVED | Noted: 2019-03-21 | Resolved: 2020-09-22

## 2020-09-22 PROCEDURE — 99215 OFFICE O/P EST HI 40 MIN: CPT | Performed by: FAMILY MEDICINE

## 2020-09-22 RX ORDER — MIRTAZAPINE 15 MG/1
15 TABLET, FILM COATED ORAL
Qty: 90 TABLET | Refills: 3 | Status: SHIPPED | OUTPATIENT
Start: 2020-09-22

## 2020-09-22 SDOH — SOCIAL STABILITY - SOCIAL INSECURITY: DEPENDENT RELATIVE NEEDING CARE AT HOME: Z63.6

## 2020-09-22 NOTE — PROGRESS NOTES
"Date of Encounter: 2020  Patient: Christiano Bryant,  3/7/1923    Subjective   History of Presenting Illness  Chief complaint: Anxiety    Anxiety and depression: Longstanding problem but worsening over the past year.  Previously on mirtazapine for this but for some reason this is fallen off of her medication list, neither her or her daughter can tell me why.  \"I want to die.  If I had a gun I would kill myself. I am too old.\"  Protective factors include her daughter. patient has been on benzodiazepines in the past which was stopped by Dr. Mccoy.  Intense anxiety, often panic attacks, especially shortly after her daughter leaves for the day.  He has difficulty sleeping due to her oxygen therapy.    Daughter, son-in-law visit her 1-2 times per day to help prepare meals, make sure she is safe.  She has difficulty cooking, cleaning, taking medications, managing finances without help.  Her daughter is increasingly stressed, and her and the patient has gotten an increasing fights due to caregiver fatigue.    Lives alone.  Never smoker.  2 alcoholic drinks per month.  Does not exercise.  Does not have a living well but would like to be DO NOT RESUSCITATE.    Review of Systems:  Negative for fever, congestion, chest pain upon exertion, change in baseline slight shortness of breath, headache    The following portions of the patient's history were reviewed and updated as appropriate: allergies, current medications, past family history, past medical history, past social history, past surgical history and problem list.    Patient Active Problem List   Diagnosis   • Hypertension   • Atrial fibrillation (CMS/HCC)   • Primary insomnia   • Back pain   • Arthritis of left knee   • Dyslipidemia   • Effusion of right knee joint   • Meningioma (CMS/HCC)   • Near syncope   • Stage III chronic kidney disease (CMS/HCC)   • TIA (transient ischemic attack)   • Generalized weakness   • Chronic dyspnea   • Cardiomyopathy (CMS/HCC)   • " Chronic diastolic congestive heart failure (CMS/HCC)   • Anxiety and depression   • Caregiver stress   • DNR (do not resuscitate)     Past Medical History:   Diagnosis Date   • Acute congestive heart failure (CMS/HCC)    • Anxiety    • Atrial fibrillation (CMS/HCC)    • Cardiomyopathy (CMS/HCC)    • Chronic diastolic (congestive) heart failure (CMS/HCC)    • CKD (chronic kidney disease), stage III (CMS/HCC)    • Dyslipidemia    • Elevated brain natriuretic peptide (BNP) level    • Elevated troponin    • Generalized weakness    • Glaucoma    • Hypertension    • Localized swelling of both lower legs    • Meningioma (CMS/HCC)    • Near syncope    • Neuropathy    • Paresthesia    • Pleural effusion due to CHF (congestive heart failure) (CMS/HCC)    • Renal insufficiency    • Short of breath on exertion    • Skin cancer    • TIA (transient ischemic attack)    • Weakness of lower extremity    • Weakness of right upper extremity      Past Surgical History:   Procedure Laterality Date   • APPENDECTOMY     • EYE SURGERY     • KNEE SURGERY Left     torn miniscus repair   • OOPHORECTOMY       Family History   Problem Relation Age of Onset   • Angina Mother    • Cancer Father    • No Known Problems Maternal Grandmother    • No Known Problems Maternal Grandfather    • No Known Problems Paternal Grandmother    • No Known Problems Paternal Grandfather        Current Outpatient Medications:   •  aspirin 81 MG chewable tablet, Chew 81 mg Daily., Disp: , Rfl:   •  furosemide (LASIX) 40 MG tablet, TAKE 2 TABLETS BY MOUTH EVERY MORNING AND 1 TABLET BY MOUTH EVERY EVENING (Patient taking differently: TAKE 1 TABLET BY MOUTH EVERY MORNING AND 1 TABLET BY MOUTH EVERY EVENING), Disp: 270 tablet, Rfl: 1  •  guaiFENesin (MUCINEX) 600 MG 12 hr tablet, Take 600 mg by mouth 2 (Two) Times a Day As Needed for Cough., Disp: , Rfl:   •  melatonin 5 MG tablet tablet, Take 5 mg by mouth Every Night., Disp: , Rfl:   •  pilocarpine (PILOCAR) 1 %  "ophthalmic solution, Administer  to both eyes 2 (Two) Times a Day., Disp: , Rfl:   •  potassium chloride (K-DUR) 10 MEQ CR tablet, TAKE 1 TABLET BY MOUTH EVERY DAY, Disp: 90 tablet, Rfl: 1  •  mirtazapine (REMERON) 15 MG tablet, Take 1 tablet by mouth every night at bedtime. For anxiety, Disp: 90 tablet, Rfl: 3  Allergies   Allergen Reactions   • Penicillins Swelling     Social History     Tobacco Use   • Smoking status: Never Smoker   • Smokeless tobacco: Never Used   • Tobacco comment: caffeine daily   Substance Use Topics   • Alcohol use: Yes     Alcohol/week: 4.0 standard drinks     Types: 4 Shots of liquor per week     Comment: cristina 4/7 days   • Drug use: No          Objective   Physical Exam  Vitals:    09/22/20 1011   BP: 132/68   Pulse: 67   SpO2: 95%   Weight: 55.6 kg (122 lb 9.6 oz)   Height: 160 cm (63\")     Body mass index is 21.72 kg/m².    Constitutional: NAD.  Eyes: EOMI. PERRLA. Normal conjunctiva.  Ear, nose, mouth, throat: No tonsillar exudates or erythema.   Normal nasal mucosa. Normal external ear canals and TMs bilaterally.  Cardiovascular: Irregularly irregular.  2/6 systolic murmur. No LE edema b/l. Radial pulses 1+ bilaterally.  Pulmonary: Diminished breath sounds bilaterally without focal findings.  No wheezing.  Poor effort.  On 4 L nasal cannula.  Integumentary: No rashes or wounds on face or upper extremities.  Lymphatic: No anterior cervical lymphadenopathy.  Endocrine: No thyromegaly or palpable thyroid nodules.  Psychiatric: Anxious affect.  Fidgeting. Normal thought content.  Suicidal ideation with plans to use a firearm, but would not go through with it because of her daughter.  \"Can you just give me some Xanax\".  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 Anxiety and depression (Primary): This appears to of falling off her chart with no particular reason, she was on it for several years and they report her anxiety was improved previously.  For now we will start mirtazapine with close follow-up.  She is not a great candidate for benzodiazepines at this time due to her generalized frailty.  She has suicidal thoughts but her protective factors include her daughter.  -     mirtazapine (REMERON) 15 MG tablet; Take 1 tablet by mouth every night at bedtime. For anxiety  Dispense: 90 tablet; Refill: 3  2. Primary insomnia  -     mirtazapine (REMERON) 15 MG tablet; Take 1 tablet by mouth every night at bedtime. For anxiety  Dispense: 90 tablet; Refill: 3    3. Caregiver stress  4. Goals of care, counseling/discussion: We had a long discussion about goals of care.  She is reaching the point where her daughter is having difficulty providing for her needs.  I recommend consideration of long-term care which would provide the patient with more social opportunities, closer access to care and resources like physical therapy, and a more secure environment.  We also discussed CODE STATUS and she is DO NOT RESUSCITATE.  They do not have a living will in place.    5. Dependence on supplemental oxygen: Expecting [employer] forms for renewal of some omental oxygen    6. Stage III chronic kidney disease (CMS/[facility])  -     Comprehensive Metabolic Panel  -     Thyroid Panel With TSH  -     CBC & Differential    7. DNR (do not resuscitate)    8. Essential hypertension    Labs as above.    Return to office in 3 months for chronic medical problems    Total visit time = 40 minutes; > 50% spent counseling/coordinating care    Joon Jerez MD  Family Medicine  O: [phone]  C: [phone]    Disclaimer: Parts of this note were dictated by speech  recognition. Minor errors in transcription may be present. Please call if questions.

## 2020-09-23 LAB
ALBUMIN SERPL-MCNC: 4.1 G/DL (ref 3.5–4.6)
ALBUMIN/GLOB SERPL: 1.5 {RATIO} (ref 1.2–2.2)
ALP SERPL-CCNC: 166 IU/L (ref 39–117)
ALT SERPL-CCNC: 13 IU/L (ref 0–32)
AST SERPL-CCNC: 18 IU/L (ref 0–40)
BASOPHILS # BLD AUTO: 0 X10E3/UL (ref 0–0.2)
BASOPHILS NFR BLD AUTO: 0 %
BILIRUB SERPL-MCNC: 1.2 MG/DL (ref 0–1.2)
BUN SERPL-MCNC: 28 MG/DL (ref 10–36)
BUN/CREAT SERPL: 26 (ref 12–28)
CALCIUM SERPL-MCNC: 10.1 MG/DL (ref 8.7–10.3)
CHLORIDE SERPL-SCNC: 105 MMOL/L (ref 96–106)
CO2 SERPL-SCNC: 27 MMOL/L (ref 20–29)
CREAT SERPL-MCNC: 1.07 MG/DL (ref 0.57–1)
EOSINOPHIL # BLD AUTO: 0.1 X10E3/UL (ref 0–0.4)
EOSINOPHIL NFR BLD AUTO: 1 %
ERYTHROCYTE [DISTWIDTH] IN BLOOD BY AUTOMATED COUNT: 13.7 % (ref 11.7–15.4)
FT4I SERPL CALC-MCNC: 2.4 (ref 1.2–4.9)
GLOBULIN SER CALC-MCNC: 2.7 G/DL (ref 1.5–4.5)
GLUCOSE SERPL-MCNC: 99 MG/DL (ref 65–99)
HCT VFR BLD AUTO: 38.8 % (ref 34–46.6)
HGB BLD-MCNC: 12.1 G/DL (ref 11.1–15.9)
IMM GRANULOCYTES # BLD AUTO: 0 X10E3/UL (ref 0–0.1)
IMM GRANULOCYTES NFR BLD AUTO: 0 %
LYMPHOCYTES # BLD AUTO: 0.6 X10E3/UL (ref 0.7–3.1)
LYMPHOCYTES NFR BLD AUTO: 13 %
MCH RBC QN AUTO: 28.3 PG (ref 26.6–33)
MCHC RBC AUTO-ENTMCNC: 31.2 G/DL (ref 31.5–35.7)
MCV RBC AUTO: 91 FL (ref 79–97)
MONOCYTES # BLD AUTO: 0.3 X10E3/UL (ref 0.1–0.9)
MONOCYTES NFR BLD AUTO: 7 %
MORPHOLOGY BLD-IMP: ABNORMAL
NEUTROPHILS # BLD AUTO: 3.6 X10E3/UL (ref 1.4–7)
NEUTROPHILS NFR BLD AUTO: 79 %
PLATELET # BLD AUTO: 98 X10E3/UL (ref 150–450)
POTASSIUM SERPL-SCNC: 4.8 MMOL/L (ref 3.5–5.2)
PROT SERPL-MCNC: 6.8 G/DL (ref 6–8.5)
RBC # BLD AUTO: 4.27 X10E6/UL (ref 3.77–5.28)
SODIUM SERPL-SCNC: 142 MMOL/L (ref 134–144)
T3RU NFR SERPL: 36 % (ref 24–39)
T4 SERPL-MCNC: 6.6 UG/DL (ref 4.5–12)
TSH SERPL DL<=0.005 MIU/L-ACNC: 3.55 UIU/ML (ref 0.45–4.5)
WBC # BLD AUTO: 4.5 X10E3/UL (ref 3.4–10.8)

## 2020-09-25 NOTE — PROGRESS NOTES
Discussed the results over the phone with the patient's daughter as previously agreed.    Sleeping better on mirtazapine, no concerns currently

## 2021-01-12 RX ORDER — POTASSIUM CHLORIDE 750 MG/1
TABLET, FILM COATED, EXTENDED RELEASE ORAL
Qty: 90 TABLET | Refills: 1 | Status: SHIPPED | OUTPATIENT
Start: 2021-01-12

## 2021-01-27 ENCOUNTER — TELEPHONE (OUTPATIENT)
Dept: INTERNAL MEDICINE | Facility: CLINIC | Age: 86
End: 2021-01-27

## 2021-01-27 NOTE — TELEPHONE ENCOUNTER
LUCAS, PATIENT'S SON, IS CALLING FOR MEDICAL ADVICE.  HE STATES THAT THE PATIENT IS SCHEDULED TO BE VACCINATED AND WANTS TO BE ADVISED IF THAT IS APPROPRIATE FOR THE PATIENT.  HE STATES THAT HIS WIFE WAS VACCINATED AND EXPERIENCED A FEVER AND OTHER SYMPTOMS AND IS CONCERNED FOR THE PATIENT.    PLEASE ADVISE    LUCAS CAN BE REACHED AT  [phone]

## 2021-02-18 RX ORDER — FUROSEMIDE 40 MG/1
TABLET ORAL
Qty: 270 TABLET | Refills: 0 | Status: SHIPPED | OUTPATIENT
Start: 2021-02-18

## 2021-02-18 NOTE — TELEPHONE ENCOUNTER
Last appt with Cristine: 8/5/2020  Next appt with Dr Ellis: 4/15/2021    Last labs: 10/12/2020 BMP

## 2021-03-23 ENCOUNTER — TELEPHONE (OUTPATIENT)
Dept: CARDIOLOGY | Facility: CLINIC | Age: 86
End: 2021-03-23

## 2023-10-19 NOTE — PROGRESS NOTES
Christiano Bryant is a 89 y.o. female.     Chief Complaint   Patient presents with   • Atrial Fibrillation     Patient is here to discuss switching warfarin.       HPI     Patient presents to the office today follow-up on history of atrial fibrillation.  Currently taking Coumadin 5 mg.  INR today is 3.0.  She's had multiple falls recently.  Striking her head.  She's also had a TIA in the past.  Does see cardiology.  Frequently bruises.      The following portions of the patient's history were reviewed and updated as appropriate: allergies, current medications, past family history, past medical history, past social history, past surgical history and problem list.    Review of Systems   Constitutional: Negative for activity change.   All other systems reviewed and are negative.      Objective  Vitals:    02/08/19 1541   BP: 124/62   Pulse: 58   Resp: 18   Temp: 98 °F (36.7 °C)   SpO2: 99%       Physical Exam   Constitutional: She is oriented to person, place, and time. She appears well-developed and well-nourished. No distress.   HENT:   Head: Normocephalic and atraumatic.   Right Ear: External ear normal.   Left Ear: External ear normal.   Nose: Nose normal.   Mouth/Throat: Oropharynx is clear and moist.   Eyes: Conjunctivae and EOM are normal. Pupils are equal, round, and reactive to light. Right eye exhibits no discharge. Left eye exhibits no discharge. No scleral icterus.   Cardiovascular: Normal rate and normal heart sounds. An irregularly irregular rhythm present.   Pulmonary/Chest: Effort normal and breath sounds normal. No respiratory distress. She has no wheezes. She has no rales.   Abdominal: Soft. Bowel sounds are normal. She exhibits no distension. There is no tenderness.   Neurological: She is alert and oriented to person, place, and time.   Skin: Skin is warm and dry. She is not diaphoretic.   Nursing note and vitals reviewed.        Current Outpatient Medications:   •  BELSOMRA 10 MG tablet, TAKE 1  Spoke to Nette at [facility] [facility] [place] and patient is scheduled 1-2-24 with Dr Diana.  Used to be a [facility] patient    TABLET BY MOUTH EVERY EVENING, Disp: 30 tablet, Rfl: 3  •  carvedilol (COREG) 6.25 MG tablet, Take 0.5 tablets by mouth 2 (Two) Times a Day With Meals. (Patient taking differently: Take 6.25 mg by mouth 2 (Two) Times a Day With Meals.), Disp: 180 tablet, Rfl: 3  •  chlorothiazide (DIURIL) 250 MG tablet, TAKE 1 TABLET BY MOUTH EVERY MORNING, Disp: 90 tablet, Rfl: 1  •  furosemide (LASIX) 20 MG tablet, TAKE 1 TABLET BY MOUTH TWICE DAILY OR AS DIRECTED, Disp: 180 tablet, Rfl: 1  •  melatonin 5 MG tablet tablet, Take 5 mg by mouth Every Night., Disp: , Rfl:   •  mirtazapine (REMERON) 15 MG tablet, TAKE 1 TABLET BY MOUTH EVERY NIGHT AT BEDTIME, Disp: 90 tablet, Rfl: 0  •  Suvorexant (BELSOMRA PO), Take  by mouth Every Night., Disp: , Rfl:   •  warfarin (COUMADIN) 5 MG tablet, TAKE 1 TABLET BY MOUTH EVERY DAY, Disp: 90 tablet, Rfl: 0  Current outpatient and discharge medications have been reconciled for the patient.  Reviewed by: Randall Mccoy MD      Procedures    Lab Results (most recent)     None                  Christiano was seen today for atrial fibrillation.    Diagnoses and all orders for this visit:    Persistent atrial fibrillation (CMS/HCC)  -     POC Protime / INR    Fall, initial encounter    Extensive conversation had with the patient regarding her current situation.  I have expressed my concern with her use of Coumadin in the past.  With respect to her multiple falls I explained the risks versus benefits of both continuing anticoagulation with Coumadin or switching to another agent versus stopping anticoagulation.  In my opinion I feel the risks of her having a bleeding incident secondary to a fall outweigh the risks of having an ischemic stroke.  I presented the options and patient would like to discontinue the Coumadin.  We discussed the need to follow-up with cardiology to let them know of the patient's decision.  Patient has an appointment at the end of March.  I discussed that stopping the medication does  increase her risk of stroke but continuing the medication also increases her risk of stroke but hemorrhagic.  I also detailed that both starting and stopping the medication does not guarantee success in either direction.      Return in about 4 weeks (around 3/8/2019) for Recheck.      Randall Mccoy MD

## 2024-04-15 NOTE — TELEPHONE ENCOUNTER
Received page from Dr. Arceo with ophthalmology from [phone]. Patient has advanced glaucoma.  Patient is under the impression that Dr. Ellis stopped her eye drops (Cosopt and Xalatan). I do not see any documentation this was intentional.  would like to know if Dr. Ellis has strong feelings to keep patient off of these, if not, he would like to resume them.     Dr. Ellis, please advise. I suppose we are ok with continuing them. Carrillo Galindo did the discharge.     Joyce- would you please call Dr. Arceo's office back once we get Dr. Ellis' input?  Please let me know if I can help in any way.    AL     complains of pain/discomfort